# Patient Record
Sex: FEMALE | Race: WHITE
[De-identification: names, ages, dates, MRNs, and addresses within clinical notes are randomized per-mention and may not be internally consistent; named-entity substitution may affect disease eponyms.]

---

## 2019-04-02 ENCOUNTER — HOSPITAL ENCOUNTER (INPATIENT)
Dept: HOSPITAL 10 - E/R | Age: 59
LOS: 3 days | Discharge: HOME | DRG: 439 | End: 2019-04-05
Payer: MEDICAID

## 2019-04-02 ENCOUNTER — HOSPITAL ENCOUNTER (INPATIENT)
Dept: HOSPITAL 91 - E/R | Age: 59
LOS: 3 days | Discharge: HOME | DRG: 439 | End: 2019-04-05
Payer: MEDICAID

## 2019-04-02 VITALS
HEIGHT: 60 IN | BODY MASS INDEX: 26.62 KG/M2 | HEIGHT: 60 IN | BODY MASS INDEX: 26.62 KG/M2 | WEIGHT: 135.58 LBS | WEIGHT: 135.58 LBS

## 2019-04-02 DIAGNOSIS — K29.70: ICD-10-CM

## 2019-04-02 DIAGNOSIS — F17.200: ICD-10-CM

## 2019-04-02 DIAGNOSIS — K85.10: Primary | ICD-10-CM

## 2019-04-02 DIAGNOSIS — K80.10: ICD-10-CM

## 2019-04-02 DIAGNOSIS — K83.8: ICD-10-CM

## 2019-04-02 DIAGNOSIS — K76.0: ICD-10-CM

## 2019-04-02 DIAGNOSIS — E11.9: ICD-10-CM

## 2019-04-02 PROCEDURE — 93005 ELECTROCARDIOGRAM TRACING: CPT

## 2019-04-02 PROCEDURE — 85610 PROTHROMBIN TIME: CPT

## 2019-04-02 PROCEDURE — 80307 DRUG TEST PRSMV CHEM ANLYZR: CPT

## 2019-04-02 PROCEDURE — 82962 GLUCOSE BLOOD TEST: CPT

## 2019-04-02 PROCEDURE — 82306 VITAMIN D 25 HYDROXY: CPT

## 2019-04-02 PROCEDURE — 83036 HEMOGLOBIN GLYCOSYLATED A1C: CPT

## 2019-04-02 PROCEDURE — 86803 HEPATITIS C AB TEST: CPT

## 2019-04-02 PROCEDURE — 96374 THER/PROPH/DIAG INJ IV PUSH: CPT

## 2019-04-02 PROCEDURE — 80053 COMPREHEN METABOLIC PANEL: CPT

## 2019-04-02 PROCEDURE — 81001 URINALYSIS AUTO W/SCOPE: CPT

## 2019-04-02 PROCEDURE — 85025 COMPLETE CBC W/AUTO DIFF WBC: CPT

## 2019-04-02 PROCEDURE — 74181 MRI ABDOMEN W/O CONTRAST: CPT

## 2019-04-02 PROCEDURE — 85730 THROMBOPLASTIN TIME PARTIAL: CPT

## 2019-04-02 PROCEDURE — 84484 ASSAY OF TROPONIN QUANT: CPT

## 2019-04-02 PROCEDURE — 82150 ASSAY OF AMYLASE: CPT

## 2019-04-02 PROCEDURE — 80061 LIPID PANEL: CPT

## 2019-04-02 PROCEDURE — 86704 HEP B CORE ANTIBODY TOTAL: CPT

## 2019-04-02 PROCEDURE — 96375 TX/PRO/DX INJ NEW DRUG ADDON: CPT

## 2019-04-02 PROCEDURE — 71045 X-RAY EXAM CHEST 1 VIEW: CPT

## 2019-04-02 PROCEDURE — 36415 COLL VENOUS BLD VENIPUNCTURE: CPT

## 2019-04-02 PROCEDURE — 76705 ECHO EXAM OF ABDOMEN: CPT

## 2019-04-02 PROCEDURE — 83690 ASSAY OF LIPASE: CPT

## 2019-04-02 PROCEDURE — C9113 INJ PANTOPRAZOLE SODIUM, VIA: HCPCS

## 2019-04-02 PROCEDURE — 83735 ASSAY OF MAGNESIUM: CPT

## 2019-04-02 PROCEDURE — 84443 ASSAY THYROID STIM HORMONE: CPT

## 2019-04-02 PROCEDURE — 87340 HEPATITIS B SURFACE AG IA: CPT

## 2019-04-02 PROCEDURE — 99285 EMERGENCY DEPT VISIT HI MDM: CPT

## 2019-04-02 PROCEDURE — 86709 HEPATITIS A IGM ANTIBODY: CPT

## 2019-04-02 PROCEDURE — 74176 CT ABD & PELVIS W/O CONTRAST: CPT

## 2019-04-03 VITALS — HEART RATE: 91 BPM | RESPIRATION RATE: 17 BRPM | DIASTOLIC BLOOD PRESSURE: 55 MMHG | SYSTOLIC BLOOD PRESSURE: 111 MMHG

## 2019-04-03 VITALS — RESPIRATION RATE: 19 BRPM | HEART RATE: 90 BPM | SYSTOLIC BLOOD PRESSURE: 110 MMHG | DIASTOLIC BLOOD PRESSURE: 58 MMHG

## 2019-04-03 VITALS — HEART RATE: 83 BPM | RESPIRATION RATE: 18 BRPM | DIASTOLIC BLOOD PRESSURE: 72 MMHG | SYSTOLIC BLOOD PRESSURE: 121 MMHG

## 2019-04-03 VITALS — HEART RATE: 80 BPM | DIASTOLIC BLOOD PRESSURE: 59 MMHG | RESPIRATION RATE: 20 BRPM | SYSTOLIC BLOOD PRESSURE: 125 MMHG

## 2019-04-03 LAB
ADD MAN DIFF?: NO
ADD MAN DIFF?: NO
ADD UMIC: YES
ALANINE AMINOTRANSFERASE: 182 IU/L (ref 13–69)
ALANINE AMINOTRANSFERASE: 234 IU/L (ref 13–69)
ALBUMIN/GLOBULIN RATIO: 1.12
ALBUMIN/GLOBULIN RATIO: 1.18
ALBUMIN: 3.6 G/DL (ref 3.3–4.9)
ALBUMIN: 4.4 G/DL (ref 3.3–4.9)
ALKALINE PHOSPHATASE: 278 IU/L (ref 42–121)
ALKALINE PHOSPHATASE: 341 IU/L (ref 42–121)
ANION GAP: 10 (ref 5–13)
ANION GAP: 13 (ref 5–13)
ASPARTATE AMINO TRANSFERASE: 135 IU/L (ref 15–46)
ASPARTATE AMINO TRANSFERASE: 93 IU/L (ref 15–46)
BASOPHIL #: 0 10^3/UL (ref 0–0.1)
BASOPHIL #: 0 10^3/UL (ref 0–0.1)
BASOPHILS %: 0.2 % (ref 0–2)
BASOPHILS %: 0.3 % (ref 0–2)
BILIRUBIN,DIRECT: 0 MG/DL (ref 0–0.2)
BILIRUBIN,DIRECT: 0 MG/DL (ref 0–0.2)
BILIRUBIN,TOTAL: 1.1 MG/DL (ref 0.2–1.3)
BILIRUBIN,TOTAL: 1.4 MG/DL (ref 0.2–1.3)
BLOOD UREA NITROGEN: 10 MG/DL (ref 7–20)
BLOOD UREA NITROGEN: 12 MG/DL (ref 7–20)
CALCIUM: 10 MG/DL (ref 8.4–10.2)
CALCIUM: 9.2 MG/DL (ref 8.4–10.2)
CARBON DIOXIDE: 24 MMOL/L (ref 21–31)
CARBON DIOXIDE: 26 MMOL/L (ref 21–31)
CHLORIDE: 100 MMOL/L (ref 97–110)
CHLORIDE: 103 MMOL/L (ref 97–110)
CHOL/HDL RATIO: 2.3 RATIO
CHOLESTEROL: 164 MG/DL (ref 100–200)
CREATININE: 0.38 MG/DL (ref 0.44–1)
CREATININE: 0.38 MG/DL (ref 0.44–1)
EOSINOPHILS #: 0 10^3/UL (ref 0–0.5)
EOSINOPHILS #: 0 10^3/UL (ref 0–0.5)
EOSINOPHILS %: 0.1 % (ref 0–7)
EOSINOPHILS %: 0.2 % (ref 0–7)
ETHANOL: < 10 MG/DL (ref 0–0)
GLOBULIN: 3.2 G/DL (ref 1.3–3.2)
GLOBULIN: 3.7 G/DL (ref 1.3–3.2)
GLUCOSE: 129 MG/DL (ref 70–220)
GLUCOSE: 168 MG/DL (ref 70–220)
HAAIG REFLEX: (no result)
HDL CHOLESTEROL: 70 MG/DL (ref 37–92)
HEMATOCRIT: 36.9 % (ref 37–47)
HEMATOCRIT: 42.1 % (ref 37–47)
HEMOGLOBIN A1C: 10.6 % (ref 0–5.9)
HEMOGLOBIN: 12.1 G/DL (ref 12–16)
HEMOGLOBIN: 13.9 G/DL (ref 12–16)
HEPATITIS B CORE ANTIBODY: NEGATIVE
HEPATITIS B SURFACE ANTIGEN: NEGATIVE
HEPATITIS C VIRAL ANTIBODY: NEGATIVE
IMMATURE GRANS #M: 0.04 10^3/UL (ref 0–0.03)
IMMATURE GRANS #M: 0.05 10^3/UL (ref 0–0.03)
IMMATURE GRANS % (M): 0.4 % (ref 0–0.43)
IMMATURE GRANS % (M): 0.5 % (ref 0–0.43)
INR: 0.99
LDL CHOLESTEROL,CALCULATED: 71 MG/DL
LIPASE: 5464 U/L (ref 23–300)
LIPASE: 8173 U/L (ref 23–300)
LYMPHOCYTES #: 1.7 10^3/UL (ref 0.8–2.9)
LYMPHOCYTES #: 1.8 10^3/UL (ref 0.8–2.9)
LYMPHOCYTES %: 14.4 % (ref 15–51)
LYMPHOCYTES %: 21.4 % (ref 15–51)
MAGNESIUM: 2.1 MG/DL (ref 1.7–2.5)
MEAN CORPUSCULAR HEMOGLOBIN: 27.7 PG (ref 29–33)
MEAN CORPUSCULAR HEMOGLOBIN: 27.8 PG (ref 29–33)
MEAN CORPUSCULAR HGB CONC: 32.8 G/DL (ref 32–37)
MEAN CORPUSCULAR HGB CONC: 33 G/DL (ref 32–37)
MEAN CORPUSCULAR VOLUME: 84 FL (ref 82–101)
MEAN CORPUSCULAR VOLUME: 84.6 FL (ref 82–101)
MEAN PLATELET VOLUME: 10.3 FL (ref 7.4–10.4)
MEAN PLATELET VOLUME: 10.8 FL (ref 7.4–10.4)
MONOCYTE #: 0.5 10^3/UL (ref 0.3–0.9)
MONOCYTE #: 0.6 10^3/UL (ref 0.3–0.9)
MONOCYTES %: 5.2 % (ref 0–11)
MONOCYTES %: 5.9 % (ref 0–11)
NEUTROPHIL #: 6.2 10^3/UL (ref 1.6–7.5)
NEUTROPHIL #: 9.5 10^3/UL (ref 1.6–7.5)
NEUTROPHILS %: 71.8 % (ref 39–77)
NEUTROPHILS %: 79.6 % (ref 39–77)
NUCLEATED RED BLOOD CELLS #: 0 10^3/UL (ref 0–0)
NUCLEATED RED BLOOD CELLS #: 0 10^3/UL (ref 0–0)
NUCLEATED RED BLOOD CELLS%: 0 /100WBC (ref 0–0)
NUCLEATED RED BLOOD CELLS%: 0 /100WBC (ref 0–0)
PARTIAL THROMBOPLASTIN TIME: 31.1 SEC (ref 23–35)
PLATELET COUNT: 159 10^3/UL (ref 140–415)
PLATELET COUNT: 166 10^3/UL (ref 140–415)
POSITIVE DIFF: (no result)
POTASSIUM: 3.6 MMOL/L (ref 3.5–5.1)
POTASSIUM: 3.8 MMOL/L (ref 3.5–5.1)
PROTIME: 13.2 SEC (ref 11.9–14.9)
PT RATIO: 1
RED BLOOD COUNT: 4.36 10^6/UL (ref 4.2–5.4)
RED BLOOD COUNT: 5.01 10^6/UL (ref 4.2–5.4)
RED CELL DISTRIBUTION WIDTH: 13.2 % (ref 11.5–14.5)
RED CELL DISTRIBUTION WIDTH: 13.2 % (ref 11.5–14.5)
SODIUM: 137 MMOL/L (ref 135–144)
SODIUM: 139 MMOL/L (ref 135–144)
THYROID STIMULATING HORMONE: < 0.015 MIU/L (ref 0.47–4.68)
TOTAL PROTEIN: 6.8 G/DL (ref 6.1–8.1)
TOTAL PROTEIN: 8.1 G/DL (ref 6.1–8.1)
TRIGLYCERIDES: 114 MG/DL (ref 0–149)
TROPONIN-I: < 0.012 NG/ML (ref 0–0.12)
UR ASCORBIC ACID: NEGATIVE MG/DL
UR BACTERIA: (no result) /HPF
UR BILIRUBIN (DIP): NEGATIVE MG/DL
UR BLOOD (DIP): (no result) MG/DL
UR CLARITY: (no result)
UR COLOR: (no result)
UR GLUCOSE (DIP): NEGATIVE MG/DL
UR KETONES (DIP): (no result) MG/DL
UR LEUKOCYTE ESTERASE (DIP): NEGATIVE LEU/UL
UR MUCUS: (no result) /HPF
UR NITRITE (DIP): NEGATIVE MG/DL
UR PH (DIP): 5 (ref 5–9)
UR RBC: 2 /HPF (ref 0–5)
UR SPECIFIC GRAVITY (DIP): 1.02 (ref 1–1.03)
UR SQUAMOUS EPITHELIAL CELL: (no result) /HPF
UR TOTAL PROTEIN (DIP): NEGATIVE MG/DL
UR UROBILINOGEN (DIP): NEGATIVE MG/DL
UR WBC: 1 /HPF (ref 0–5)
WHITE BLOOD COUNT: 11.9 10^3/UL (ref 4.8–10.8)
WHITE BLOOD COUNT: 8.6 10^3/UL (ref 4.8–10.8)

## 2019-04-03 RX ADMIN — INSULIN GLARGINE 1 UNITS: 100 INJECTION, SOLUTION SUBCUTANEOUS at 13:04

## 2019-04-03 RX ADMIN — LIDOCAINE HYDROCHLORIDE 1 MLS/HR: 10 INJECTION, SOLUTION EPIDURAL; INFILTRATION; INTRACAUDAL; PERINEURAL at 00:02

## 2019-04-03 RX ADMIN — ASCORBIC ACID, VITAMIN A PALMITATE, CHOLECALCIFEROL, THIAMINE HYDROCHLORIDE, RIBOFLAVIN-5 PHOSPHATE SODIUM, PYRIDOXINE HYDROCHLORIDE, NIACINAMIDE, DEXPANTHENOL, ALPHA-TOCOPHEROL ACETATE, VITAMIN K1, FOLIC ACID, BIOTIN, CYANOCOBALAMIN 1 MLS/HR: 200; 3300; 200; 6; 3.6; 6; 40; 15; 10; 150; 600; 60; 5 INJECTION, SOLUTION INTRAVENOUS at 10:40

## 2019-04-03 RX ADMIN — CIPROFLOXACIN 1 MLS/HR: 2 INJECTION, SOLUTION INTRAVENOUS at 05:42

## 2019-04-03 RX ADMIN — HYDROMORPHONE HYDROCHLORIDE 1 MG: 1 INJECTION, SOLUTION INTRAMUSCULAR; INTRAVENOUS; SUBCUTANEOUS at 21:57

## 2019-04-03 RX ADMIN — INSULIN ASPART 1 UNIT: 100 INJECTION, SOLUTION INTRAVENOUS; SUBCUTANEOUS at 13:04

## 2019-04-03 RX ADMIN — FOLIC ACID SCH MLS/HR: 5 INJECTION, SOLUTION INTRAMUSCULAR; INTRAVENOUS; SUBCUTANEOUS at 10:40

## 2019-04-03 RX ADMIN — THIAMINE HYDROCHLORIDE 1 MLS/HR: 100 INJECTION, SOLUTION INTRAMUSCULAR; INTRAVENOUS at 02:31

## 2019-04-03 RX ADMIN — HYDROMORPHONE HYDROCHLORIDE 1 MG: 1 INJECTION, SOLUTION INTRAMUSCULAR; INTRAVENOUS; SUBCUTANEOUS at 09:54

## 2019-04-03 RX ADMIN — INSULIN ASPART 1 UNIT: 100 INJECTION, SOLUTION INTRAVENOUS; SUBCUTANEOUS at 17:59

## 2019-04-03 RX ADMIN — HYDROMORPHONE HYDROCHLORIDE PRN MG: 1 INJECTION, SOLUTION INTRAMUSCULAR; INTRAVENOUS; SUBCUTANEOUS at 21:57

## 2019-04-03 RX ADMIN — PANTOPRAZOLE SODIUM 1 MG: 40 INJECTION, POWDER, FOR SOLUTION INTRAVENOUS at 10:48

## 2019-04-03 RX ADMIN — PANTOPRAZOLE SODIUM SCH MG: 40 INJECTION, POWDER, FOR SOLUTION INTRAVENOUS at 10:48

## 2019-04-03 RX ADMIN — HYDROMORPHONE HYDROCHLORIDE PRN MG: 1 INJECTION, SOLUTION INTRAMUSCULAR; INTRAVENOUS; SUBCUTANEOUS at 18:00

## 2019-04-03 RX ADMIN — INSULIN ASPART 1 UNIT: 100 INJECTION, SOLUTION INTRAVENOUS; SUBCUTANEOUS at 21:26

## 2019-04-03 RX ADMIN — INSULIN ASPART 1 UNIT: 100 INJECTION, SOLUTION INTRAVENOUS; SUBCUTANEOUS at 09:00

## 2019-04-03 RX ADMIN — HYDROMORPHONE HYDROCHLORIDE 1 MG: 1 INJECTION, SOLUTION INTRAMUSCULAR; INTRAVENOUS; SUBCUTANEOUS at 18:00

## 2019-04-03 RX ADMIN — KETOROLAC TROMETHAMINE 1 MG: 15 INJECTION, SOLUTION INTRAMUSCULAR; INTRAVENOUS at 10:40

## 2019-04-03 RX ADMIN — INSULIN GLARGINE SCH UNITS: 100 INJECTION, SOLUTION SUBCUTANEOUS at 13:04

## 2019-04-03 RX ADMIN — CIPROFLOXACIN 1 MLS/HR: 2 INJECTION, SOLUTION INTRAVENOUS at 02:30

## 2019-04-03 RX ADMIN — ONDANSETRON HYDROCHLORIDE 1 MG: 2 INJECTION, SOLUTION INTRAMUSCULAR; INTRAVENOUS at 00:02

## 2019-04-03 RX ADMIN — FAMOTIDINE 1 MG: 10 INJECTION, SOLUTION INTRAVENOUS at 00:02

## 2019-04-03 NOTE — ERD
ER Documentation


Chief Complaint


Chief Complaint





EPIGASTRIC PAIN X'S 4 DAYS





HPI


This is a 58-year-old female with epigastric pain for 4 days.  Pain is mild to 


moderate intensity with associated nausea and 4 episodes of vomiting nonbilious 


and nonbloody.  She denies any fevers or chills.  Denies any recent abdominal 


trauma.  Denies any sick contacts.  Denies any change in bowel habits.  Has not 


been able to tolerate p.o. today secondary to pain.





ROS


All systems reviewed and are negative except as per history of present illness.





Allergies


Allergies:  


Coded Allergies:  


     No Known Allergy (Unverified , 4/3/19)





PMhx/Soc


Medical and Surgical Hx:  pt denies Medical Hx, pt denies Surgical Hx


Hx Alcohol Use:  No


Hx Substance Use:  No


Hx Tobacco Use:  No


Smoking Status:  Never smoker





Physical Exam


Vitals





Vital Signs


  Date      Temp  Pulse  Resp  B/P (MAP)   Pulse Ox  O2          O2 Flow    FiO2


Time                                                 Delivery    Rate


    4/2/19  99.3     89    20      140/69        98  Room Air


     23:36                           (92)


    4/2/19  99.3     95    20      145/65        98


     21:25                           (91)





Physical Exam


Const:   No acute distress


Head:   Atraumatic 


Eyes:    Normal Conjunctiva


ENT:    Normal External Ears, Nose and Mouth.


Neck:               Full range of motion. No meningismus.


Resp:   Clear to auscultation bilaterally


Cardio:   Regular rate and rhythm, no murmurs


Abd:    Soft, non tender, non distended. Normal bowel sounds


Skin:   No petechiae or rashes


Back:   No midline or flank tenderness


Ext:    No cyanosis, or edema


Neur:   Awake and alert


Psych:    Normal Mood and Affect


Result Diagram:  


4/2/19 2356                                                                     


          4/2/19 2356





Results 24 hrs





Laboratory Tests


              Test
                                   4/2/19
23:56


              White Blood Count                      11.9 10^3/ul


              Red Blood Count                        5.01 10^6/ul


              Hemoglobin                                13.9 g/dl


              Hematocrit                                   42.1 %


              Mean Corpuscular Volume                     84.0 fl


              Mean Corpuscular Hemoglobin                 27.7 pg


              Mean Corpuscular Hemoglobin
Concent      33.0 g/dl 



              Red Cell Distribution Width                  13.2 %


              Platelet Count                          159 10^3/UL


              Mean Platelet Volume                        10.8 fl


              Immature Granulocytes %                     0.400 %


              Neutrophils %                                79.6 %


              Lymphocytes %                                14.4 %


              Monocytes %                                   5.2 %


              Eosinophils %                                 0.1 %


              Basophils %                                   0.3 %


              Nucleated Red Blood Cells %             0.0 /100WBC


              Immature Granulocytes #               0.050 10^3/ul


              Neutrophils #                           9.5 10^3/ul


              Lymphocytes #                           1.7 10^3/ul


              Monocytes #                             0.6 10^3/ul


              Eosinophils #                           0.0 10^3/ul


              Basophils #                             0.0 10^3/ul


              Nucleated Red Blood Cells #             0.0 10^3/ul


              Urine Color                          JAYLA


              Urine Clarity
                       SLIGHTLY
CLOUDY


              Urine pH                                        5.0


              Urine Specific Gravity                        1.023


              Urine Ketones                              1+ mg/dL


              Urine Nitrite                        NEGATIVE mg/dL


              Urine Bilirubin                      NEGATIVE mg/dL


              Urine Urobilinogen                   NEGATIVE mg/dL


              Urine Leukocyte Esterase
            NEGATIVE
Zaid/ul


              Urine Microscopic RBC                        2 /HPF


              Urine Microscopic WBC                        1 /HPF


              Urine Squamous Epithelial
Cells      MODERATE /HPF 



              Urine Bacteria                       FEW /HPF


              Urine Mucus                          MODERATE /HPF


              Urine Hemoglobin                           1+ mg/dL


              Urine Glucose                        NEGATIVE mg/dL


              Urine Total Protein                  NEGATIVE mg/dl


              Sodium Level                             137 mmol/L


              Potassium Level                          3.8 mmol/L


              Chloride Level                           100 mmol/L


              Carbon Dioxide Level                      24 mmol/L


              Anion Gap                                        13


              Blood Urea Nitrogen                        12 mg/dl


              Creatinine                               0.38 mg/dl


              Est Glomerular Filtrat Rate
mL/min   > 60 mL/min 



              Glucose Level                             168 mg/dl


              Calcium Level                            10.0 mg/dl


              Total Bilirubin                           1.4 mg/dl


              Direct Bilirubin                         0.00 mg/dl


              Indirect Bilirubin                        1.4 mg/dl


              Aspartate Amino Transf
(AST/SGOT)         135 IU/L 



              Alanine Aminotransferase
(ALT/SGPT)       234 IU/L 



              Alkaline Phosphatase                       341 IU/L


              Troponin I                           < 0.012 ng/ml


              Total Protein                              8.1 g/dl


              Albumin                                    4.4 g/dl


              Globulin                                  3.70 g/dl


              Albumin/Globulin Ratio                         1.18


              Lipase                                     8173 U/L





Current Medications


 Medications
   Dose
          Sig/Bita
       Start Time
   Status  Last


 (Trade)       Ordered        Route
 PRN     Stop Time              Admin
Dose


                              Reason                                Admin


 Sodium         500 ml @ 
     Q1H STAT
      4/2/19        DC            4/3/19


Chloride       500 mls/hr     IV
            23:41
 4/3/19                00:02



                                             00:40


 Morphine       4 mg           ONCE  STAT
    4/2/19        DC            4/3/19


Sulfate
                      IV
            23:41
 4/2/19                00:02



(morphine)                                   23:42


 Ondansetron    4 mg           ONCE  STAT
    4/2/19        DC            4/3/19


HCl
  (Zofran                 IV
            23:41
 4/2/19                00:02



Inj)                                         23:42


 Famotidine
    20 mg          ONCE  STAT
    4/2/19        DC            4/3/19


(Pepcid Iv)                   IV
            23:41
 4/2/19                00:02



                                             23:42


                200 ml @ 
     ONCE  STAT
    4/3/19                 



Ciprofloxacin  200 mls/hr     IVPB
          02:30
 4/3/19


/
 Dextrose                                  03:29


 Sodium         1,000 ml @ 
   Q6H40M
 IV
    4/3/19                 



Chloride       150 mls/hr                    02:31



 IV Flush
      3 ml           PER            4/3/19                 



(NS 3 ml)                     PROTOCOL
 IV
  03:00



 Ondansetron    4 mg           Q6H  PRN
      4/3/19                 



HCl
  (Zofran                 IV
            03:00



Inj)                          NAUSEA/VOMITI


                              NG


                650 mg         Q6H  PRN
      4/3/19                 



Acetaminophen                 PO
 .PAIN 1-3  03:00




  (Tylenol                   OR TEMP


Tab)


                0.5 mg         Q4H  PRN
      4/3/19                 



Hydromorphone                 IV
 .SEVERE    03:00



HCl
                          PAIN 7-10


(Dilaudid)


 Docusate       100 mg         Q12H  PRN
     4/3/19                 



Sodium
                       PO
            03:00



(Colace)                      .CONSTIPATION


 Bisacodyl
     5 mg           DAILY  PRN
    4/3/19                 



(Dulcolax)                    PO
            03:00



                              .CONSTIPATION








Procedures/MDM


EKG: 


Rate/Rhythm:             [Normal Sinus Rhythm]


QRS, ST, T-waves:    [No changes consistent w/ acute ischemia], normal 


intervals, upgoing T waves, normal axis


Impression:      [No evidence of ischemia or arrhythmia]





Chest X-ray 1V Interpreted by me:


Soft Tissue:                                               No acute 


abnormalities


Bones:                                                    No acute abnormalities


Mediastinum/Cardiac Silhouette/Lungs:     [No acute abnormalities]








Medical decision making: This is a patient has what looks to be gallstone 


pancreatitis.  Patient has been fluid hydrated given pain medication.  Patient 


admitted to hospitalist with surgical consult from Dr. Brizuela.





Departure


Diagnosis:  


   Primary Impression:  


   Gallstone pancreatitis


   Additional Impression:  


   Epigastric pain


Condition:  Serious











ASHLEY LOMAX              Apr 3, 2019 02:39

## 2019-04-03 NOTE — CONS
Assessment/Plan


Assessment/Plan


Hospital Course (Demo Recall)


1.  Abdominal pain


2.  Nausea vomiting


3.  Gallstones


4.  Pancreatitis


5.  Transaminitis


6.  Hyperbilirubinemia


-N.p.o.


-Judicious IV fluid


-Trend labs


-GI consult


-MRCP


-Outpatient follow-up for cholecystectomy


7.  Gastritis


-Diet and lifestyle optimization


-Antacids





Thank you very much for consulting me this patient's care,





Consultation Date/Type/Reason


Admit Date/Time


Apr 3, 2019 at 02:30


Date of Consultation:  Apr 3, 2019


Type of Consult


General surgical


Reason for Consultation


Abdominal pain


Gallstone


Pancreatitis


Requesting Provider:  ASHLEY LOMAX


Date/Time of Note


DATE: 4/3/19 


TIME: 04:11





Hx of Present Illness


58-year-old female presents with 4 days of abdominal pain and epigastric region 


associated with nausea vomiting chills but no fever.  No cough.  No seizure.  No


blood per mouth or rectum.  No change in bowel habits.  No dysuria.  No color 


change of skin stool urine or eyeballs.  No previous history of the same.  No 


trauma or sick contacts.  Her workup identified pancreatitis with gallstones.  


LFTs and bilirubin are also elevated.  She is being admitted.  Surgical consult 


is obtained further evaluation and treatment.


12 point review of system is negative unless otherwise addressed in chart





Past Medical History


Gastritis


Questionable colonic issues


Acute pancreatitis


Acute transaminitis


Acute hyperbilirubinemia


Acute leukocytosis


Home Meds


Reported Medications


Acetaminophen* (Acetaminophen*) 500 MG Extra Strength Tablet, 500 MG PO Q4H PRN 


for PAIN AND OR ELEVATED TEMP, TAB


   4/3/19


Omeprazole* (Omeprazole*) Unknown Strength Capsule.dr, PO BID, #60 CAP


   4/3/19


Metformin* (Glucophage*) Unknown Strength Tab, PO WITH BREAKFAST DINNE, #30 TAB


   4/3/19


Medications





Current Medications


Sodium Chloride 1,000 ml @  150 mls/hr Q6H40M IV  Last administered on 4/3/19at 


02:31; Admin Dose 150 MLS/HR;  Start 4/3/19 at 02:31


IV Flush (NS 3 ml) 3 ml PER PROTOCOL IV ;  Start 4/3/19 at 03:00


Ondansetron HCl (Zofran Inj) 4 mg Q6H  PRN IV NAUSEA/VOMITING;  Start 4/3/19 at 


03:00


Acetaminophen (Tylenol Tab) 650 mg Q6H  PRN PO .PAIN 1-3 OR TEMP;  Start 4/3/19 


at 03:00


Hydromorphone HCl (Dilaudid) 0.5 mg Q4H  PRN IV .SEVERE PAIN 7-10;  Start 4/3/19


at 03:00


Docusate Sodium (Colace) 100 mg Q12H  PRN PO .CONSTIPATION;  Start 4/3/19 at 


03:00


Bisacodyl (Dulcolax) 5 mg DAILY  PRN PO .CONSTIPATION;  Start 4/3/19 at 03:00


Allergies:  


Coded Allergies:  


     No Known Allergy (Unverified , 4/3/19)





Past Surgical History


Bilateral axillary cystic lesion excision


Knee surgery





Family History


Significant Family History:  no pertinent family hx





Social History


Alcohol Use:  rarely


Smoking Status:  Current every day smoker


Drug Use:  none





Exam/Review of Systems


Exam


Vitals





Vital Signs


  Date      Temp  Pulse  Resp  B/P (MAP)   Pulse Ox  O2          O2 Flow    FiO2


Time                                                 Delivery    Rate


    4/3/19  98.8     90    19      110/58        97


     03:34                           (75)


    4/3/19                                           Room Air


     02:44





Constitutional:  alert, oriented; 


   No distress


Psych:  nl mood/affect; 


   No anxiety


Head:  normocephalic, atraumatic


Eyes:  nl conjunctiva, EOMI, PERRL


ENMT:  nl external ears & nose, mucosa pink and moist


Neck:  supple, non-tender; 


   No jvd


Respiratory:  normal air movement; 


   No congested cough, No labored breathing


Cardiovascular:  regular rate and rhythm; 


   No edema


Gastrointestinal:  soft, tender (Minimal); 


   No distended, No rebound or guarding


Musculoskeletal:  nl extremities to inspection; 


   No joint tenderness


Extremities:  normal pulses; 


   No calf tenderness, No cyanosis


Neurological:  nl mental status, nl speech, nl strength


Skin:  nl turgor; 


   No rash or lesions, No diaphoresis


Lymph:  nl lymph nodes





Results


Result Diagram:  


4/2/19 6256                                                                     


          4/2/19 2356





Results 24hrs





Laboratory Tests


            Test
                                      4/2/19
23:56


            White Blood Count                               11.9  H


            Red Blood Count                                  5.01


            Hemoglobin                                       13.9


            Hematocrit                                       42.1


            Mean Corpuscular Volume                          84.0


            Mean Corpuscular Hemoglobin                     27.7  L


            Mean Corpuscular Hemoglobin
Concent             33.0  



            Red Cell Distribution Width                      13.2


            Platelet Count                                    159


            Mean Platelet Volume                            10.8  H


            Immature Granulocytes %                         0.400


            Neutrophils %                                   79.6  H


            Lymphocytes %                                   14.4  L


            Monocytes %                                       5.2


            Eosinophils %                                     0.1


            Basophils %                                       0.3


            Nucleated Red Blood Cells %                       0.0


            Immature Granulocytes #                        0.050  H


            Neutrophils #                                    9.5  H


            Lymphocytes #                                     1.7


            Monocytes #                                       0.6


            Eosinophils #                                     0.0


            Basophils #                                       0.0


            Nucleated Red Blood Cells #                       0.0


            Urine Color                          JAYLA


            Urine Clarity
                       SLIGHTLY
CLOUDY  A


            Urine pH                                          5.0


            Urine Specific Gravity                          1.023


            Urine Ketones                                     1+  H


            Urine Nitrite                        NEGATIVE


            Urine Bilirubin                      NEGATIVE


            Urine Urobilinogen                   NEGATIVE


            Urine Leukocyte Esterase             NEGATIVE


            Urine Microscopic RBC                               2


            Urine Microscopic WBC                               1


            Urine Squamous Epithelial
Cells      MODERATE  



            Urine Bacteria                       FEW  A


            Urine Mucus                          MODERATE


            Urine Hemoglobin                                  1+  H


            Urine Glucose                        NEGATIVE


            Urine Total Protein                  NEGATIVE


            Sodium Level                                      137


            Potassium Level                                   3.8


            Chloride Level                                    100


            Carbon Dioxide Level                               24


            Anion Gap                                          13


            Blood Urea Nitrogen                                12


            Creatinine                                      0.38  L


            Est Glomerular Filtrat Rate
mL/min   > 60  



            Glucose Level                                     168


            Calcium Level                                    10.0


            Total Bilirubin                                  1.4  H


            Direct Bilirubin                                 0.00


            Indirect Bilirubin                               1.4  H


            Aspartate Amino Transf
(AST/SGOT)               135  H



            Alanine Aminotransferase
(ALT/SGPT)             234  H



            Alkaline Phosphatase                             341  H


            Troponin I                           < 0.012


            Total Protein                                     8.1


            Albumin                                           4.4


            Globulin                                        3.70  H


            Albumin/Globulin Ratio                           1.18


            Lipase                                          8173  H








Medications


Medication





Current Medications


Sodium Chloride 1,000 ml @  150 mls/hr Q6H40M IV  Last administered on 4/3/19at 


02:31; Admin Dose 150 MLS/HR;  Start 4/3/19 at 02:31


IV Flush (NS 3 ml) 3 ml PER PROTOCOL IV ;  Start 4/3/19 at 03:00


Ondansetron HCl (Zofran Inj) 4 mg Q6H  PRN IV NAUSEA/VOMITING;  Start 4/3/19 at 


03:00


Acetaminophen (Tylenol Tab) 650 mg Q6H  PRN PO .PAIN 1-3 OR TEMP;  Start 4/3/19 


at 03:00


Hydromorphone HCl (Dilaudid) 0.5 mg Q4H  PRN IV .SEVERE PAIN 7-10;  Start 4/3/19


at 03:00


Docusate Sodium (Colace) 100 mg Q12H  PRN PO .CONSTIPATION;  Start 4/3/19 at 


03:00


Bisacodyl (Dulcolax) 5 mg DAILY  PRN PO .CONSTIPATION;  Start 4/3/19 at 03:00











JOSE MARIA LU MD               Apr 3, 2019 04:12

## 2019-04-03 NOTE — CONS
Assessment/Plan


Assessment/Plan


Hospital Course (Demo Recall)


Summary Assessment and Plan:


Assessment:


Acute pancreatitis likely secondary to cholelithiasis


Cholelithiasis


Elevated LFTs-down


   -Hepatitis serology pending


Indirect hyperbilirubinemia-resolved





Plan:


MRCP is currently pending if positive will proceed with ERCP after pancreatitis 


has improved/resolved


Keep n.p.o. push IV fluids


Pain management as needed


Continue close observation


Patient seen in collaboration with Dr. Cheng


CC:   JACKI CHENG MD ;





Consultation Date/Type/Reason


Admit Date/Time


Apr 3, 2019 at 02:30


Date of Consultation:  Apr 3, 2019


Type of Consult


GI


Reason for Consultation


Pancreatitis, likely secondary to gallstones


Date/Time of Note


DATE: 4/3/19 


TIME: 12:27





Hx of Present Illness


This is a 58-year-old Divehi-speaking female (an  was used) resents 


to the hospital with complaints of upper abdominal pain associate with nausea 


nonbloody vomiting here patient underwent imaging showing distended gallbladder 


with cholelithiasis and mildly dilated common bile duct additionally labs were 


obtained, with noted indirect hyperbilirubinemia and elevated LFTs as well as a 


very elevated lipase of 8173.  Serology was checked and currently pending 


toxicology was completed and negative for alcohol INR 0.99.  She has been 


consulted for possible gallstone pancreatitis.  Given imaging findings and MRCP 


has been ordered and is currently pending patient is currently n.p.o.  She 


complains of upper left quadrant pain radiating to the back 8 out of 10.  She 


now denies further episodes of nausea or vomiting and denies overt signs of GI 


bleed, constipation, or diarrhea.  Discussed plan to push IV fluids pain 


management as needed obtain MRCP.  If positive will proceed with ERCP with 


resolution of pancreatitis.


Review of Systems: 


[A 12 system, review was conducted and is negative except as noted in the HPI or


 here.]





Past Medical History


Home Meds


Reported Medications


Acetaminophen* (Acetaminophen*) 500 MG Extra Strength Tablet, 500 MG PO Q4H PRN 


for PAIN AND OR ELEVATED TEMP, TAB


   4/3/19


Omeprazole* (Omeprazole*) Unknown Strength Capsule.dr, PO BID, #60 CAP


   4/3/19


Metformin* (Glucophage*) Unknown Strength Tab, PO WITH BREAKFAST DINNE, #30 TAB


   4/3/19


Medications





Current Medications


IV Flush (NS 3 ml) 3 ml PER PROTOCOL IV ;  Start 4/3/19 at 03:00


Ondansetron HCl (Zofran Inj) 4 mg Q6H  PRN IV NAUSEA/VOMITING;  Start 4/3/19 at 


03:00


Acetaminophen (Tylenol Tab) 650 mg Q6H  PRN PO .PAIN 1-3 OR TEMP;  Start 4/3/19 


at 03:00


Docusate Sodium (Colace) 100 mg Q12H  PRN PO .CONSTIPATION;  Start 4/3/19 at 


03:00


Bisacodyl (Dulcolax) 5 mg DAILY  PRN PO .CONSTIPATION;  Start 4/3/19 at 03:00


Diagnostic Test (Pha) (Accu-Chek) 1 ea 02 XX ;  Start 4/4/19 at 02:00


Insulin Aspart (Novolog Insulin Pen) NOVOLOG *MILD* ALGORI... Q4 SC ;  Start 


4/3/19 at 09:00


Miscellaneous Information 1 ea NOTE XX ;  Start 4/3/19 at 09:00


Glucose (Glutose) 15 gm Q15M  PRN PO DECREASED GLUCOSE;  Start 4/3/19 at 09:00


Glucose (Glutose) 22.5 gm Q15M  PRN PO DECREASED GLUCOSE;  Start 4/3/19 at 09:00


Dextrose (D50w Syringe) 25 ml Q15M  PRN IV DECREASED GLUCOSE;  Start 4/3/19 at 


09:00


Dextrose (D50w Syringe) 50 ml Q15M  PRN IV DECREASED GLUCOSE;  Start 4/3/19 at 


09:00


Glucagon (Glucagen) 1 mg Q15M  PRN IM DECREASED GLUCOSE;  Start 4/3/19 at 09:00


Glucose (Glutose) 15 gm Q15M  PRN BUCCAL DECREASED GLUCOSE;  Start 4/3/19 at 


09:00


Hydromorphone HCl (Dilaudid) 1 mg Q4H  PRN IV .SEVERE PAIN 7-10;  Start 4/3/19 


at 11:00


Dextrose/Sodium Chloride 1,000 ml @  80 mls/hr [id number] IV  Last administered on 


4/3/19at 10:40; Admin Dose 80 MLS/HR;  Start 4/3/19 at 10:30


Insulin Glargine (Lantus) 9 units DAILY@0800 SC ;  Start 4/3/19 at 10:30


Pantoprazole (Protonix Iv) 40 mg DAILY@06 IV  Last administered on 4/3/19at 


10:48; Admin Dose 40 MG;  Start 4/3/19 at 10:30


Allergies:  


Coded Allergies:  


     No Known Allergy (Unverified , 4/3/19)





Social History


Alcohol Use:  rarely


Smoking Status:  Current every day smoker


Drug Use:  none





Exam/Review of Systems


Exam


Vitals





Vital Signs


  Date      Temp  Pulse  Resp  B/P (MAP)   Pulse Ox  O2          O2 Flow    FiO2


Time                                                 Delivery    Rate


    4/3/19  98.4     91    17      111/55        95


     07:16                           (73)


    4/3/19                                           Room Air


     02:44








Intake and Output





4/2/19


4/2/19


4/3/19





1515:00


23:00


07:00





IntakeIntake Total


575 ml





BalanceBalance


575 ml











Exam


PHYSICAL EXAMINATION:


GENERAL: Well developed, well nourished, alert & oriented x 3


SKIN: No lesions


EYES: Pupils equal reactive to light,  no discharge.


EARS/NOSE AND THROAT: Ears normal, nose normal


NECK: Supple, no masses


CHEST: Inspection within normal limits.


CARDIOVASCULAR: Heart: Regular rate and rhythm


RESPIRATORY: Lungs clear to auscultation 


GASTROINTESTINAL AND LIVER: Abdomen: Soft, LUQ pain 8/10, non-distended, no 


hernias, no masses, no organomegaly, no ascites, no guarding, no rebound 


tenderness, normoactive bowel sounds. Rectal: Deferred. 


EXTREMITIES: No cyanosis, clubbing or edema.





Results


Result Diagram:  


4/3/19 0418                                                                     


          4/3/19 0418





Results 24hrs





Laboratory Tests


Test
                     4/2/19
23:56  4/3/19
04:18  4/3/19
08:41  4/3/19
09:46


White Blood Count              11.9  H        8.6  #


Red Blood Count                 5.01          4.36


Hemoglobin                      13.9          12.1


Hematocrit                      42.1         36.9  L


Mean Corpuscular                84.0          84.6


Volume


Mean Corpuscular               27.7  L       27.8  L


Hemoglobin


Mean Corpuscular               33.0  
       32.8  
  
             



Hemoglobin
Concent


Red Cell                        13.2          13.2


Distribution Width


Platelet Count                   159           166


Mean Platelet                  10.8  H        10.3


Volume


Immature                       0.400        0.500  H


Granulocytes %


Neutrophils %                  79.6  H        71.8


Lymphocytes %                  14.4  L        21.4


Monocytes %                      5.2           5.9


Eosinophils %                    0.1           0.2


Basophils %                      0.3           0.2


Nucleated Red                    0.0           0.0


Blood Cells %


Immature                      0.050  H      0.040  H


Granulocytes #


Neutrophils #                   9.5  H         6.2


Lymphocytes #                    1.7           1.8


Monocytes #                      0.6           0.5


Eosinophils #                    0.0           0.0


Basophils #                      0.0           0.0


Nucleated Red                    0.0           0.0


Blood Cells #


Urine Color         JAYLA


Urine Clarity
      SLIGHTLY
CLOUDY  A  
             
             



Urine pH                         5.0


Urine Specific                 1.023


Gravity


Urine Ketones                    1+  H


Urine Nitrite       NEGATIVE


Urine Bilirubin     NEGATIVE


Urine Urobilinogen  NEGATIVE


Urine Leukocyte     NEGATIVE


Esterase


Urine Microscopic                  2


RBC


Urine Microscopic                  1


WBC


Urine Squamous      MODERATE  
         
             
             



Epithelial
Cells


Urine Bacteria      FEW  A


Urine Mucus         MODERATE


Urine Hemoglobin                 1+  H


Urine Glucose       NEGATIVE


Urine Total         NEGATIVE


Protein


Sodium Level                     137           139


Potassium Level                  3.8           3.6


Chloride Level                   100           103


Carbon Dioxide                    24            26


Level


Anion Gap                         13            10


Blood Urea                        12            10


Nitrogen


Creatinine                     0.38  L       0.38  L


Est Glomerular      > 60  
             > 60  
       
             



Filtrat


Rate
mL/min


Glucose Level                    168           129


Calcium Level                   10.0           9.2


Total Bilirubin                 1.4  H         1.1


Direct Bilirubin                0.00          0.00


Indirect Bilirubin              1.4  H         1.1


Aspartate Amino                135  H
        93  H
  
             



Transf
(AST/SGOT)


Alanine                        234  H
       182  H
  
             



Aminotransferase
(


ALT/SGPT)


Alkaline                        341  H        278  H


Phosphatase


Troponin I          < 0.012


Total Protein                    8.1          6.8  #


Albumin                          4.4           3.6


Globulin                       3.70  H        3.20


Albumin/Globulin                1.18          1.12


Ratio


Lipase                         8173  H       5464  H


Hemoglobin A1c                               10.6  H


Magnesium Level                                2.1


Triglycerides                                  114


Level


Cholesterol Level                              164


LDL Cholesterol,                                71


Calculated


HDL Cholesterol                                 70


Cholesterol/HDL                                2.3


Ratio


Thyroid             
                   < 0.015  L
   
             



Stimulating


Hormone
(TSH)


Bedside Glucose                                              145


Prothrombin Time                                                          13.2


Prothrombin Time                                                           1.0


Ratio


INR International   
                   
             
                  0.99  



Normalized
Ratio


Activated           
                   
             
                  31.1  



Partial
Thrombopla


st Time


Ethyl Alcohol                                                       < 10.0  H


Level


Hepatitis B                                                         NEGATIVE


Surface Antigen


Hepatitis B Core    
                   
             
             NEGATIVE  



Total
Antibody


Hepatitis C                                                         NEGATIVE


Antibody








Medications


Medication





Current Medications


IV Flush (NS 3 ml) 3 ml PER PROTOCOL IV ;  Start 4/3/19 at 03:00


Ondansetron HCl (Zofran Inj) 4 mg Q6H  PRN IV NAUSEA/VOMITING;  Start 4/3/19 at 


03:00


Acetaminophen (Tylenol Tab) 650 mg Q6H  PRN PO .PAIN 1-3 OR TEMP;  Start 4/3/19 


at 03:00


Docusate Sodium (Colace) 100 mg Q12H  PRN PO .CONSTIPATION;  Start 4/3/19 at 03:


00


Bisacodyl (Dulcolax) 5 mg DAILY  PRN PO .CONSTIPATION;  Start 4/3/19 at 03:00


Diagnostic Test (Pha) (Accu-Chek) 1 ea 02 XX ;  Start 4/4/19 at 02:00


Insulin Aspart (Novolog Insulin Pen) NOVOLOG *MILD* ALGORI... Q4 SC ;  Start 


4/3/19 at 09:00


Miscellaneous Information 1 ea NOTE XX ;  Start 4/3/19 at 09:00


Glucose (Glutose) 15 gm Q15M  PRN PO DECREASED GLUCOSE;  Start 4/3/19 at 09:00


Glucose (Glutose) 22.5 gm Q15M  PRN PO DECREASED GLUCOSE;  Start 4/3/19 at 09:00


Dextrose (D50w Syringe) 25 ml Q15M  PRN IV DECREASED GLUCOSE;  Start 4/3/19 at 


09:00


Dextrose (D50w Syringe) 50 ml Q15M  PRN IV DECREASED GLUCOSE;  Start 4/3/19 at 


09:00


Glucagon (Glucagen) 1 mg Q15M  PRN IM DECREASED GLUCOSE;  Start 4/3/19 at 09:00


Glucose (Glutose) 15 gm Q15M  PRN BUCCAL DECREASED GLUCOSE;  Start 4/3/19 at 


09:00


Hydromorphone HCl (Dilaudid) 1 mg Q4H  PRN IV .SEVERE PAIN 7-10;  Start 4/3/19 


at 11:00


Dextrose/Sodium Chloride 1,000 ml @  80 mls/hr [id number] IV  Last administered on 


4/3/19at 10:40; Admin Dose 80 MLS/HR;  Start 4/3/19 at 10:30


Insulin Glargine (Lantus) 9 units DAILY@0800 SC ;  Start 4/3/19 at 10:30


Pantoprazole (Protonix Iv) 40 mg DAILY@06 IV  Last administered on 4/3/19at 


10:48; Admin Dose 40 MG;  Start 4/3/19 at 10:30











EMMETT DUQUE              Apr 3, 2019 12:30

## 2019-04-03 NOTE — HP
Date/Time of Note


Date/Time of Note


DATE: 4/3/19 


TIME: 07:54





Assessment/Plan


VTE Prophylaxis


SCD applied (from Nsg):  Yes


Pharmacological prophylaxis:  NA/contraindicated


Pharm contraindication:  low risk/ambulating





Lines/Catheters


IV Catheter Type (from Nrsg):  Peripheral IV


Urinary Cath still in place:  No





Assessment/Plan


Hospital Course


This is a 50-year-old female being admitted to the [facility] floor for:





#1 gallstone pancreatitis: Likely secondary to gallstone.  Patient does have 


transaminitis with hyperbilirubinemia.  CT scan does show CBD dilatation.  Will 


obtain a right upper quadrant ultrasound.  Will obtain an MRCP.  General surgery


is Jeffrey been consulted by the ED.  Will consult GI as well.  We will keep the 


patient n.p.o.  IV fluid hydration with normal saline.  Dilaudid for pain.  Will


check hemoglobin A1c, lipid panel, TSH.  Will also check ethanol level.





#2  Diabetes mellitus: We will check hemoglobin A1c, will hold metformin at the 


current time.  Insulin sliding scale





#3 DVT GI prophylaxis: SCDs, no GI prophylaxis indicated





Further treatment strategy will be implemented as per the clinical course.


Result Diagram:  


4/3/19 0418                                                                     


          4/3/19 0418





Results 24hrs





Laboratory Tests


     Test
                                      4/2/19
23:56  4/3/19
04:18


     White Blood Count                               11.9  H        8.6  #


     Red Blood Count                                  5.01          4.36


     Hemoglobin                                       13.9          12.1


     Hematocrit                                       42.1         36.9  L


     Mean Corpuscular Volume                          84.0          84.6


     Mean Corpuscular Hemoglobin                     27.7  L       27.8  L


     Mean Corpuscular Hemoglobin
Concent             33.0  
       32.8  



     Red Cell Distribution Width                      13.2          13.2


     Platelet Count                                    159           166


     Mean Platelet Volume                            10.8  H        10.3


     Immature Granulocytes %                         0.400        0.500  H


     Neutrophils %                                   79.6  H        71.8


     Lymphocytes %                                   14.4  L        21.4


     Monocytes %                                       5.2           5.9


     Eosinophils %                                     0.1           0.2


     Basophils %                                       0.3           0.2


     Nucleated Red Blood Cells %                       0.0           0.0


     Immature Granulocytes #                        0.050  H      0.040  H


     Neutrophils #                                    9.5  H         6.2


     Lymphocytes #                                     1.7           1.8


     Monocytes #                                       0.6           0.5


     Eosinophils #                                     0.0           0.0


     Basophils #                                       0.0           0.0


     Nucleated Red Blood Cells #                       0.0           0.0


     Urine Color                          JAYLA


     Urine Clarity
                       SLIGHTLY
CLOUDY  A  



     Urine pH                                          5.0


     Urine Specific Gravity                          1.023


     Urine Ketones                                     1+  H


     Urine Nitrite                        NEGATIVE


     Urine Bilirubin                      NEGATIVE


     Urine Urobilinogen                   NEGATIVE


     Urine Leukocyte Esterase             NEGATIVE


     Urine Microscopic RBC                               2


     Urine Microscopic WBC                               1


     Urine Squamous Epithelial
Cells      MODERATE  
         



     Urine Bacteria                       FEW  A


     Urine Mucus                          MODERATE


     Urine Hemoglobin                                  1+  H


     Urine Glucose                        NEGATIVE


     Urine Total Protein                  NEGATIVE


     Sodium Level                                      137           139


     Potassium Level                                   3.8           3.6


     Chloride Level                                    100           103


     Carbon Dioxide Level                               24            26


     Anion Gap                                          13            10


     Blood Urea Nitrogen                                12            10


     Creatinine                                      0.38  L       0.38  L


     Est Glomerular Filtrat Rate
mL/min   > 60  
             > 60  



     Glucose Level                                     168           129


     Calcium Level                                    10.0           9.2


     Total Bilirubin                                  1.4  H         1.1


     Direct Bilirubin                                 0.00          0.00


     Indirect Bilirubin                               1.4  H         1.1


     Aspartate Amino Transf
(AST/SGOT)               135  H
        93  H



     Alanine Aminotransferase
(ALT/SGPT)             234  H
       182  H



     Alkaline Phosphatase                             341  H        278  H


     Troponin I                           < 0.012


     Total Protein                                     8.1          6.8  #


     Albumin                                           4.4           3.6


     Globulin                                        3.70  H        3.20


     Albumin/Globulin Ratio                           1.18          1.12


     Lipase                                          8173  H       5464  H


     Hemoglobin A1c                                                10.6  H


     Magnesium Level                                                 2.1


     Triglycerides Level                                             114


     Cholesterol Level                                               164


     LDL Cholesterol, Calculated                                      71


     HDL Cholesterol                                                  70


     Cholesterol/HDL Ratio                                           2.3


     Thyroid Stimulating Hormone
(TSH)    
                   < 0.015  L









HPI/ROS


Admit Date/Time


Admit Date/Time


Apr 3, 2019 at 02:30





Hx of Present Illness


Chief complaint: Epigastric pain times 4 days





This is a 58-year-old female presents with 4 days of abdominal pain and 


epigastric region associated with nausea vomiting chills but no fever.  No 


cough.  No seizure.  No blood per mouth or rectum.  No change in bowel habits.  


No dysuria.  No color change of skin stool urine or eyeballs.  No previous 


history of the same.  No trauma or sick contacts.  





Allergies: NKDA





Medications: None





ROS


Const: As per HPI


Eyes : No pain discharge or redness or change in visual acuity


ENT: No pain, sore throat, congestion, congestion,  dysphagia or discharge


Respiratory: No shortness of breath, cough, sputum, wheezing, or pleuritic pain


Cardiovascular: No chest pain, palpitation, PND, or edema


GI : As per HPI


Genitourinary: No dysuria, hematuria, flank pain ,  discharge or CVA tenderness


Musculoskeletal: No joint pain, back pain, neck pain, restricted range of motion


in neck or joints


Skin: No rash, bruising or hives 


Neuro: No headache, dizziness, syncope, seizure, focal weakness


Endocrine: No polyuria, polydipsia, temperature intolerance


Psych: No hallucination, depression, anxiety or suicidal ideation





PMH/Family/Social


Past Medical History


Diabetes


Medications





Current Medications


Sodium Chloride 1,000 ml @  150 mls/hr Q6H40M IV  Last administered on 4/3/19at 


02:31; Admin Dose 150 MLS/HR;  Start 4/3/19 at 02:31


IV Flush (NS 3 ml) 3 ml PER PROTOCOL IV ;  Start 4/3/19 at 03:00


Ondansetron HCl (Zofran Inj) 4 mg Q6H  PRN IV NAUSEA/VOMITING;  Start 4/3/19 at 


03:00


Acetaminophen (Tylenol Tab) 650 mg Q6H  PRN PO .PAIN 1-3 OR TEMP;  Start 4/3/19 


at 03:00


Hydromorphone HCl (Dilaudid) 0.5 mg Q4H  PRN IV .SEVERE PAIN 7-10;  Start 4/3/19


at 03:00


Docusate Sodium (Colace) 100 mg Q12H  PRN PO .CONSTIPATION;  Start 4/3/19 at 


03:00


Bisacodyl (Dulcolax) 5 mg DAILY  PRN PO .CONSTIPATION;  Start 4/3/19 at 03:00


Coded Allergies:  


     No Known Allergy (Unverified , 4/3/19)





Past Surgical History


Right knee surgery





Family History


Significant Family History:  no pertinent family hx





Social History


Alcohol Use:  rarely


Smoking Status:  Current every day smoker


Drug Use:  none





Exam/Review of Systems


Vital Signs


Vitals





Vital Signs


  Date      Temp  Pulse  Resp  B/P (MAP)   Pulse Ox  O2          O2 Flow    FiO2


Time                                                 Delivery    Rate


    4/3/19  98.4     91    17      111/55        95


     07:16                           (73)


    4/3/19                                           Room Air


     02:44








Intake and Output





4/2/19


4/2/19


4/3/19





1414:59


22:59


06:59





IntakeIntake Total


575 ml





BalanceBalance


575 ml














Exam


Exam





General: Patient is a pleasant female currently lying in bed in no acute 


distress


HEENT: Atraumatic, normocephalic. The pupils are equal, round and reactive. 


Extraocular motor are intact


Neck: Supple with full range of motion. No rigidity or meningismus


Chest: Nontender


Lungs: Clear to auscultation bilaterally no crackles rales or wheezing


Heart: Normal S1-S2, Regular rhythm and rate. No murmur, S3, or S4


Abdomen: Soft , tender to palpation over the right upper quadrant as well as 


epigastric area,, bowel sounds are present. No guarding no rebound tenderness , 


No masses or organomegaly. No costovertebral temporal angle mass


Extremities: Normal to inspection, no edema no cyanosis


Neurologic: Normal mental status, speech normal, cranial nerves II through XII 


are intact, motor and sensory are intact,


Additional Comments


PROCEDURE:    CT ABDOMEN/PELVIS WITHOUT CONTRAST  


 


CLINICAL INDICATION:   58-year-old female with abdominal pain. 


 


TECHNIQUE:   The study was performed utilizing a [employer] VCT 64-slice CT 


scanner.  Direct axial sections were obtained through the abdomen and pelvis 


without the use of intravenous contrast material. Sagittal and coronal 


reformations were obtained.  One or more of the following dose reduction 


techniques were utilized: automated exposure control, adjustment of the mA 


and/or kV according to patient's size, use of iterative reconstruction 


technique.  DICOM images are available. The images were reviewed on a PACS 


workstation.   CTD/vol = 8.05 mGy; Total Exam DLP = 454.03 mGy.cm. 


 


COMPARISON:    None. 


 


FINDINGS:


 


There is minimal bibasilar subsegmental atelectasis.  There is no evidence for 


significant pleural effusion.  The liver has a normal size and contour without 


focal areas of abnormal density. No intrahepatic biliary ductal dilatation is 


seen. There is a lateral gallbladder which appears to be distended and contains 


multiple small dependent gallstones without gallbladder wall thickening. The 


distal common bile duct is enlarged measuring approximately 8 mm. The pancreas 


is without areas of abnormal attenuation.  The spleen is identified and has a 


normal size without abnormal density. The adrenal glands are unremarkable. The 


kidneys are without abnormal density. No hydroureteronephrosis nor 


nephroureterolithiasis is evident. The urinary bladder contains urine. There is 


no evidence for bowel obstruction.  The appendix is visualized and is without 


abnormal thickening or surrounding inflammatory reaction. The uterus is 


unremarkable. There is no significant free fluid. The aortoiliac vessels 


are mildly calcified but without aneurysmal dilatation. The osseous structures 


are intact. 


 


IMPRESSION:


 


1.  Distended lateral gallbladder with cholelithiasis and mildly dilated common 


bile duct.


2.  No CT evidence for appendicitis.


3.  Mild vascular calcifications.


_____________________________________________ 


.Igor Jenkins MD, MD           Date    Time 


Electronically viewed and signed by .Igor Jenkins MD, MD on 04/03/2019 01:27 


 


D:  04/03/2019 01:27  T:  04/03/2019 01:27


.M/





CC: ASHLEY LOMAX





[id number]


PROCEDURE:   CHEST - 1 VIEW  


 


CLINICAL INDICATION:   58-year-old female with chest/abdominal pain. 


 


TECHNIQUE:   A single frontal AP semi-erect portable view of the chest was 


performed.  The images were reviewed on a PACS workstation. 


 


COMPARISON:   None.  


 


FINDINGS:


 


The cardiomediastinal silhouette has a normal appearance.  There is no evidence 


for an infiltrate.  There is no evidence for congestive heart failure. There is 


no evidence for pneumothorax. The osseous structures are intact. 


 


IMPRESSION:


 


No evidence for active cardiopulmonary disease.


_____________________________________________ 


.Igor Jenkins MD, MD           Date    Time 


Electronically viewed and signed by .Igor Jenkins MD, MD on 04/03/2019 01:30 


 


D:  04/03/2019 01:30  T:  04/03/2019 01:30


.M/





CC: ASHLEY LOMAX





[id number]











IBAN VALLADARES                  Apr 3, 2019 08:04

## 2019-04-03 NOTE — QN
Documentation


Comment


This is a 58-year-old female with history of diabetes, admitted with sudden 


onset of right lower quadrant abdominal pain associated with 


nonbilious/nonbloody vomiting times 4-day duration, found to have biliary 


pancreatitis.





Appreciate surgery recommendation who recommended outpatient elective 


cholecystectomy.  Patient with CBD dilatation and CT, as such MRCP has been 


ordered.  We will follow-up MRCP findings and gastroenterology recommendations. 


Continue n.p.o. status.  Will add insulin Lantus for diabetes management.  Trend


lipase.  Will give 1 dose of Toradol as patient is in excruciating abdominal 


pain and will increase Dilaudid to 1 mg as needed.





Case discussed with Dr. Elizondo.











JOSÉ MANUEL HULL NP                Apr 3, 2019 10:37

## 2019-04-04 VITALS — HEART RATE: 70 BPM | RESPIRATION RATE: 18 BRPM | DIASTOLIC BLOOD PRESSURE: 54 MMHG | SYSTOLIC BLOOD PRESSURE: 104 MMHG

## 2019-04-04 VITALS — RESPIRATION RATE: 20 BRPM | HEART RATE: 74 BPM | SYSTOLIC BLOOD PRESSURE: 118 MMHG | DIASTOLIC BLOOD PRESSURE: 58 MMHG

## 2019-04-04 VITALS — HEART RATE: 78 BPM | RESPIRATION RATE: 18 BRPM | DIASTOLIC BLOOD PRESSURE: 54 MMHG | SYSTOLIC BLOOD PRESSURE: 108 MMHG

## 2019-04-04 VITALS — RESPIRATION RATE: 20 BRPM | SYSTOLIC BLOOD PRESSURE: 121 MMHG | HEART RATE: 84 BPM | DIASTOLIC BLOOD PRESSURE: 58 MMHG

## 2019-04-04 LAB
ADD MAN DIFF?: NO
ALANINE AMINOTRANSFERASE: 130 IU/L (ref 13–69)
ALBUMIN/GLOBULIN RATIO: 1.12
ALBUMIN: 3.6 G/DL (ref 3.3–4.9)
ALKALINE PHOSPHATASE: 241 IU/L (ref 42–121)
ANION GAP: 10 (ref 5–13)
ASPARTATE AMINO TRANSFERASE: 58 IU/L (ref 15–46)
BASOPHIL #: 0 10^3/UL (ref 0–0.1)
BASOPHILS %: 0.4 % (ref 0–2)
BILIRUBIN,DIRECT: 0 MG/DL (ref 0–0.2)
BILIRUBIN,TOTAL: 0.6 MG/DL (ref 0.2–1.3)
BLOOD UREA NITROGEN: 9 MG/DL (ref 7–20)
CALCIUM: 9.3 MG/DL (ref 8.4–10.2)
CARBON DIOXIDE: 27 MMOL/L (ref 21–31)
CHLORIDE: 106 MMOL/L (ref 97–110)
CREATININE: 0.43 MG/DL (ref 0.44–1)
EOSINOPHILS #: 0.1 10^3/UL (ref 0–0.5)
EOSINOPHILS %: 1.8 % (ref 0–7)
GLOBULIN: 3.2 G/DL (ref 1.3–3.2)
GLUCOSE: 146 MG/DL (ref 70–220)
HEMATOCRIT: 38.8 % (ref 37–47)
HEMOGLOBIN: 12.3 G/DL (ref 12–16)
IMMATURE GRANS #M: 0.03 10^3/UL (ref 0–0.03)
IMMATURE GRANS % (M): 0.6 % (ref 0–0.43)
LIPASE: 459 U/L (ref 23–300)
LYMPHOCYTES #: 1.8 10^3/UL (ref 0.8–2.9)
LYMPHOCYTES %: 36.3 % (ref 15–51)
MAGNESIUM: 2.2 MG/DL (ref 1.7–2.5)
MEAN CORPUSCULAR HEMOGLOBIN: 27.4 PG (ref 29–33)
MEAN CORPUSCULAR HGB CONC: 31.7 G/DL (ref 32–37)
MEAN CORPUSCULAR VOLUME: 86.4 FL (ref 82–101)
MEAN PLATELET VOLUME: 10.5 FL (ref 7.4–10.4)
MONOCYTE #: 0.5 10^3/UL (ref 0.3–0.9)
MONOCYTES %: 9.9 % (ref 0–11)
NEUTROPHIL #: 2.5 10^3/UL (ref 1.6–7.5)
NEUTROPHILS %: 51 % (ref 39–77)
NUCLEATED RED BLOOD CELLS #: 0 10^3/UL (ref 0–0)
NUCLEATED RED BLOOD CELLS%: 0 /100WBC (ref 0–0)
PLATELET COUNT: 160 10^3/UL (ref 140–415)
POTASSIUM: 3.8 MMOL/L (ref 3.5–5.1)
RED BLOOD COUNT: 4.49 10^6/UL (ref 4.2–5.4)
RED CELL DISTRIBUTION WIDTH: 13.2 % (ref 11.5–14.5)
SODIUM: 143 MMOL/L (ref 135–144)
TOTAL PROTEIN: 6.8 G/DL (ref 6.1–8.1)
WHITE BLOOD COUNT: 4.9 10^3/UL (ref 4.8–10.8)

## 2019-04-04 RX ADMIN — PIPERACILLIN SODIUM AND TAZOBACTAM SODIUM SCH MLS/HR: 3; .375 INJECTION, POWDER, LYOPHILIZED, FOR SOLUTION INTRAVENOUS at 23:20

## 2019-04-04 RX ADMIN — PIPERACILLIN SODIUM AND TAZOBACTAM SODIUM 1 MLS/HR: 3; .375 INJECTION, POWDER, LYOPHILIZED, FOR SOLUTION INTRAVENOUS at 23:20

## 2019-04-04 RX ADMIN — PIPERACILLIN SODIUM AND TAZOBACTAM SODIUM SCH MLS/HR: 3; .375 INJECTION, POWDER, LYOPHILIZED, FOR SOLUTION INTRAVENOUS at 11:41

## 2019-04-04 RX ADMIN — INSULIN ASPART 1 UNIT: 100 INJECTION, SOLUTION INTRAVENOUS; SUBCUTANEOUS at 20:29

## 2019-04-04 RX ADMIN — HYDROMORPHONE HYDROCHLORIDE PRN MG: 1 INJECTION, SOLUTION INTRAMUSCULAR; INTRAVENOUS; SUBCUTANEOUS at 10:23

## 2019-04-04 RX ADMIN — INSULIN ASPART 1 UNIT: 100 INJECTION, SOLUTION INTRAVENOUS; SUBCUTANEOUS at 17:44

## 2019-04-04 RX ADMIN — INSULIN ASPART 1 UNIT: 100 INJECTION, SOLUTION INTRAVENOUS; SUBCUTANEOUS at 01:45

## 2019-04-04 RX ADMIN — PIPERACILLIN SODIUM AND TAZOBACTAM SODIUM SCH MLS/HR: 3; .375 INJECTION, POWDER, LYOPHILIZED, FOR SOLUTION INTRAVENOUS at 05:17

## 2019-04-04 RX ADMIN — ASCORBIC ACID, VITAMIN A PALMITATE, CHOLECALCIFEROL, THIAMINE HYDROCHLORIDE, RIBOFLAVIN-5 PHOSPHATE SODIUM, PYRIDOXINE HYDROCHLORIDE, NIACINAMIDE, DEXPANTHENOL, ALPHA-TOCOPHEROL ACETATE, VITAMIN K1, FOLIC ACID, BIOTIN, CYANOCOBALAMIN 1 MLS/HR: 200; 3300; 200; 6; 3.6; 6; 40; 15; 10; 150; 600; 60; 5 INJECTION, SOLUTION INTRAVENOUS at 12:41

## 2019-04-04 RX ADMIN — FOLIC ACID SCH MLS/HR: 5 INJECTION, SOLUTION INTRAMUSCULAR; INTRAVENOUS; SUBCUTANEOUS at 12:41

## 2019-04-04 RX ADMIN — ASCORBIC ACID, VITAMIN A PALMITATE, CHOLECALCIFEROL, THIAMINE HYDROCHLORIDE, RIBOFLAVIN-5 PHOSPHATE SODIUM, PYRIDOXINE HYDROCHLORIDE, NIACINAMIDE, DEXPANTHENOL, ALPHA-TOCOPHEROL ACETATE, VITAMIN K1, FOLIC ACID, BIOTIN, CYANOCOBALAMIN 1 MLS/HR: 200; 3300; 200; 6; 3.6; 6; 40; 15; 10; 150; 600; 60; 5 INJECTION, SOLUTION INTRAVENOUS at 00:19

## 2019-04-04 RX ADMIN — INSULIN GLARGINE SCH UNITS: 100 INJECTION, SOLUTION SUBCUTANEOUS at 09:26

## 2019-04-04 RX ADMIN — INSULIN ASPART 1 UNIT: 100 INJECTION, SOLUTION INTRAVENOUS; SUBCUTANEOUS at 05:00

## 2019-04-04 RX ADMIN — PIPERACILLIN SODIUM AND TAZOBACTAM SODIUM 1 MLS/HR: 3; .375 INJECTION, POWDER, LYOPHILIZED, FOR SOLUTION INTRAVENOUS at 05:17

## 2019-04-04 RX ADMIN — INSULIN GLARGINE 1 UNITS: 100 INJECTION, SOLUTION SUBCUTANEOUS at 09:26

## 2019-04-04 RX ADMIN — HYDROMORPHONE HYDROCHLORIDE PRN MG: 1 INJECTION, SOLUTION INTRAMUSCULAR; INTRAVENOUS; SUBCUTANEOUS at 02:02

## 2019-04-04 RX ADMIN — HYDROMORPHONE HYDROCHLORIDE 1 MG: 1 INJECTION, SOLUTION INTRAMUSCULAR; INTRAVENOUS; SUBCUTANEOUS at 10:23

## 2019-04-04 RX ADMIN — HYDROMORPHONE HYDROCHLORIDE 1 MG: 1 INJECTION, SOLUTION INTRAMUSCULAR; INTRAVENOUS; SUBCUTANEOUS at 17:40

## 2019-04-04 RX ADMIN — PIPERACILLIN SODIUM AND TAZOBACTAM SODIUM 1 MLS/HR: 3; .375 INJECTION, POWDER, LYOPHILIZED, FOR SOLUTION INTRAVENOUS at 17:13

## 2019-04-04 RX ADMIN — HYDROMORPHONE HYDROCHLORIDE PRN MG: 1 INJECTION, SOLUTION INTRAMUSCULAR; INTRAVENOUS; SUBCUTANEOUS at 05:57

## 2019-04-04 RX ADMIN — PIPERACILLIN SODIUM AND TAZOBACTAM SODIUM SCH MLS/HR: 3; .375 INJECTION, POWDER, LYOPHILIZED, FOR SOLUTION INTRAVENOUS at 17:13

## 2019-04-04 RX ADMIN — INSULIN ASPART 1 UNIT: 100 INJECTION, SOLUTION INTRAVENOUS; SUBCUTANEOUS at 12:58

## 2019-04-04 RX ADMIN — PANTOPRAZOLE SODIUM SCH MG: 40 INJECTION, POWDER, FOR SOLUTION INTRAVENOUS at 05:19

## 2019-04-04 RX ADMIN — INSULIN ASPART 1 UNIT: 100 INJECTION, SOLUTION INTRAVENOUS; SUBCUTANEOUS at 09:20

## 2019-04-04 RX ADMIN — FOLIC ACID SCH MLS/HR: 5 INJECTION, SOLUTION INTRAMUSCULAR; INTRAVENOUS; SUBCUTANEOUS at 00:19

## 2019-04-04 RX ADMIN — HYDROMORPHONE HYDROCHLORIDE 1 MG: 1 INJECTION, SOLUTION INTRAMUSCULAR; INTRAVENOUS; SUBCUTANEOUS at 05:57

## 2019-04-04 RX ADMIN — PANTOPRAZOLE SODIUM 1 MG: 40 INJECTION, POWDER, FOR SOLUTION INTRAVENOUS at 05:19

## 2019-04-04 RX ADMIN — HYDROMORPHONE HYDROCHLORIDE PRN MG: 1 INJECTION, SOLUTION INTRAMUSCULAR; INTRAVENOUS; SUBCUTANEOUS at 17:40

## 2019-04-04 RX ADMIN — HYDROMORPHONE HYDROCHLORIDE 1 MG: 1 INJECTION, SOLUTION INTRAMUSCULAR; INTRAVENOUS; SUBCUTANEOUS at 02:02

## 2019-04-04 RX ADMIN — PIPERACILLIN SODIUM AND TAZOBACTAM SODIUM 1 MLS/HR: 3; .375 INJECTION, POWDER, LYOPHILIZED, FOR SOLUTION INTRAVENOUS at 11:41

## 2019-04-04 NOTE — PN
Date/Time of Note


Date/Time of Note


DATE: 4/4/19 


TIME: 10:58





Assessment/Plan


VTE Prophylaxis


Risk score (from Ns)>0 risk:  2


SCD applied (from Ns):  Yes


Pharmacological prophylaxis:  NA/contraindicated


Pharm contraindication:  low risk/ambulating





Lines/Catheters


IV Catheter Type (from [facility]):  Peripheral IV


Urinary Cath still in place:  No





Assessment/Plan


Hospital Course


SUBJECTIVE: Lying in bed, with improved right-sided abdominal pain.  Had regular


bowel movements.  No nausea or vomiting.








OBJECTIVE: Vital signs-see below








PHYSICAL EXAM:


Constitutional: Well-developed, adequately built, lying in bed comfortably.


Psych:  nl mood/affect, no complaints


Head:  atraumatic, normocephalic


Eyes:  nl conjunctiva, nl sclera


ENMT:  mucosa pink and moist, nl external ears & nose


Neck:  non-tender, supple


Respiratory:  clear to auscultation, normal air movement


Cardiovascular:  nl pulses, regular rate and rhythm


Gastrointestinal:   Mild tenderness to right upper and lower quadrant.  .soft, 


bowel sounds active in all 4 quadrants.


Musculoskeletal/extremities:Nl extremities to inspection, motor strength equal 


bilaterally, no focal deficit. Normal pulses,no cyanosis, no edema.


Neurological: Alert oriented 3,nl speech, nl strength


Skin:  nl turgor





ASSESSMENT/PLAN: 58-year-old female with diabetes, admitted with RLQ abdominal 


pain associated with nonbilious/nonbloody vomiting times 4-day duration found to


have gallstone pancreatitis.





1.  Gallstone pancreatitis.


-Resolving nicely.


-If no plan for ERCP or cholecystectomy, will start patient on diet.  Follow-up 


GI and surgery recommendations.


-Continue trending up lipase and LFTs.





2.  Cholelithiasis with probable cholecystitis.


-Surgery following and will follow up their recommendations.  Patient eventually


needs cholecystectomy currently decision pending whether this is inpatient 


versus outpatient.





3. CBD dilatation 10 mm in size, no choledocholithiasis identified an MRI


- GI following and possibly needs ERCP


-Bilirubinemia resolved.





 4.DMII


-Stable glycemic trends.  Continue insulin regimen.





5.  Fatty liver.


-Lifestyle changes advised.





DVT prophylaxis: SCDs/ambulation.


PUD prophylaxis: Not indicated


CODE STATUS: Full code


Diet: N.p.o. until cleared from GI/surgery standpoint.





Disposition: Continue current management.  Overall patient with resolving 


lipase.  At this time, decision pending whether patient needs ERCP versus 


cholecystectomy as inpatient.  We will start patient on a diet if there is no 


surgical/procedural plan.





Patient was seen in collaboration with Dr. Elizondo.


Result Diagram:  


4/4/19 0418                                                                     


          4/4/19 0418





Results 24hrs





Laboratory Tests


Test
                     4/3/19
13:00  4/3/19
17:55  4/3/19
21:23  4/4/19
01:33


Bedside Glucose                  149           147           143           166


Test
                     4/4/19
04:18  4/4/19
05:16  4/4/19
08:55  



White Blood Count               4.9  #


Red Blood Count                 4.49


Hemoglobin                      12.3


Hematocrit                      38.8


Mean Corpuscular Volume         86.4


Mean Corpuscular               27.4  L


Hemoglobin


Mean Corpuscular              31.7  L
  
             
             



Hemoglobin
Concent


Red Cell Distribution           13.2


Width


Platelet Count                   160


Mean Platelet Volume           10.5  H


Immature Granulocytes %       0.600  H


Neutrophils %                   51.0


Lymphocytes %                   36.3


Monocytes %                      9.9


Eosinophils %                    1.8


Basophils %                      0.4


Nucleated Red Blood              0.0


Cells %


Immature Granulocytes #        0.030


Neutrophils #                    2.5


Lymphocytes #                    1.8


Monocytes #                      0.5


Eosinophils #                    0.1


Basophils #                      0.0


Nucleated Red Blood              0.0


Cells #


Sodium Level                     143


Potassium Level                  3.8


Chloride Level                   106


Carbon Dioxide Level              27


Anion Gap                         10


Blood Urea Nitrogen                9


Creatinine                     0.43  L


Est Glomerular Filtrat    > 60  
       
             
             



Rate
mL/min


Glucose Level                    146


Calcium Level                    9.3


Magnesium Level                  2.2


Total Bilirubin                  0.6


Direct Bilirubin                0.00


Indirect Bilirubin               0.6


Aspartate Amino                 58  H
  
             
             



Transf
(AST/SGOT)


Alanine                        130  H
  
             
             



Aminotransferase
(ALT/SG


PT)


Alkaline Phosphatase            241  H


Total Protein                    6.8


Albumin                          3.6


Globulin                        3.20


Albumin/Globulin Ratio          1.12


Lipase                          459  H


Bedside Glucose                                133           161








Exam/Review of Systems


Exam


Vitals





Vital Signs


  Date      Temp  Pulse  Resp  B/P (MAP)   Pulse Ox  O2          O2 Flow    FiO2


Time                                                 Delivery    Rate


    4/4/19  98.3     78    18      108/54        94  Room Air


     07:55                           (72)








Intake and Output





4/3/19


4/3/19


4/4/19





1515:00


23:00


07:00





IntakeIntake Total


500 ml


500 ml


1080 ml





BalanceBalance


500 ml


500 ml


1080 ml














Results


Results 24hrs





Laboratory Tests


Test
                     4/3/19
13:00  4/3/19
17:55  4/3/19
21:23  4/4/19
01:33


Bedside Glucose                  149           147           143           166


Test
                     4/4/19
04:18  4/4/19
05:16  4/4/19
08:55  



White Blood Count               4.9  #


Red Blood Count                 4.49


Hemoglobin                      12.3


Hematocrit                      38.8


Mean Corpuscular Volume         86.4


Mean Corpuscular               27.4  L


Hemoglobin


Mean Corpuscular              31.7  L
  
             
             



Hemoglobin
Concent


Red Cell Distribution           13.2


Width


Platelet Count                   160


Mean Platelet Volume           10.5  H


Immature Granulocytes %       0.600  H


Neutrophils %                   51.0


Lymphocytes %                   36.3


Monocytes %                      9.9


Eosinophils %                    1.8


Basophils %                      0.4


Nucleated Red Blood              0.0


Cells %


Immature Granulocytes #        0.030


Neutrophils #                    2.5


Lymphocytes #                    1.8


Monocytes #                      0.5


Eosinophils #                    0.1


Basophils #                      0.0


Nucleated Red Blood              0.0


Cells #


Sodium Level                     143


Potassium Level                  3.8


Chloride Level                   106


Carbon Dioxide Level              27


Anion Gap                         10


Blood Urea Nitrogen                9


Creatinine                     0.43  L


Est Glomerular Filtrat    > 60  
       
             
             



Rate
mL/min


Glucose Level                    146


Calcium Level                    9.3


Magnesium Level                  2.2


Total Bilirubin                  0.6


Direct Bilirubin                0.00


Indirect Bilirubin               0.6


Aspartate Amino                 58  H
  
             
             



Transf
(AST/SGOT)


Alanine                        130  H
  
             
             



Aminotransferase
(ALT/SG


PT)


Alkaline Phosphatase            241  H


Total Protein                    6.8


Albumin                          3.6


Globulin                        3.20


Albumin/Globulin Ratio          1.12


Lipase                          459  H


Bedside Glucose                                133           161








Medications


Medication





Current Medications


IV Flush (NS 3 ml) 3 ml PER PROTOCOL IV ;  Start 4/3/19 at 03:00


Ondansetron HCl (Zofran Inj) 4 mg Q6H  PRN IV NAUSEA/VOMITING;  Start 4/3/19 at 


03:00


Acetaminophen (Tylenol Tab) 650 mg Q6H  PRN PO .PAIN 1-3 OR TEMP;  Start 4/3/19 


at 03:00


Docusate Sodium (Colace) 100 mg Q12H  PRN PO .CONSTIPATION;  Start 4/3/19 at 


03:00


Bisacodyl (Dulcolax) 5 mg DAILY  PRN PO .CONSTIPATION;  Start 4/3/19 at 03:00


Diagnostic Test (Pha) (Accu-Chek) 1 ea 02 XX ;  Start 4/4/19 at 02:00


Insulin Aspart (Novolog Insulin Pen) NOVOLOG *MILD* ALGORI... Q4 SC  Last 


administered on 4/4/19at 09:20; Admin Dose 1 UNIT;  Start 4/3/19 at 09:00


Miscellaneous Information 1 ea NOTE XX ;  Start 4/3/19 at 09:00


Glucose (Glutose) 15 gm Q15M  PRN PO DECREASED GLUCOSE;  Start 4/3/19 at 09:00


Glucose (Glutose) 22.5 gm Q15M  PRN PO DECREASED GLUCOSE;  Start 4/3/19 at 09:00


Dextrose (D50w Syringe) 25 ml Q15M  PRN IV DECREASED GLUCOSE;  Start 4/3/19 at 


09:00


Dextrose (D50w Syringe) 50 ml Q15M  PRN IV DECREASED GLUCOSE;  Start 4/3/19 at 


09:00


Glucagon (Glucagen) 1 mg Q15M  PRN IM DECREASED GLUCOSE;  Start 4/3/19 at 09:00


Glucose (Glutose) 15 gm Q15M  PRN BUCCAL DECREASED GLUCOSE;  Start 4/3/19 at 


09:00


Hydromorphone HCl (Dilaudid) 1 mg Q4H  PRN IV .SEVERE PAIN 7-10 Last administere


d on 4/4/19at 10:23; Admin Dose 1 MG;  Start 4/3/19 at 11:00


Dextrose/Sodium Chloride 1,000 ml @  80 mls/hr [id number] IV  Last administered on 


4/4/19at 00:19; Admin Dose 80 MLS/HR;  Start 4/3/19 at 10:30


Insulin Glargine (Lantus) 9 units DAILY@0800 SC  Last administered on 4/4/19at 


09:26; Admin Dose 9 UNITS;  Start 4/3/19 at 10:30


Pantoprazole (Protonix Iv) 40 mg DAILY@06 IV  Last administered on 4/4/19at 


05:19; Admin Dose 40 MG;  Start 4/3/19 at 10:30


Piperacillin Sod/ Tazobactam Sod 100 ml @  200 mls/hr Q6 IVPB  Last administered


on 4/4/19at 05:17; Admin Dose 200 MLS/HR;  Start 4/4/19 at 06:00











JOSÉ MANUEL HULL NP                Apr 4, 2019 11:00

## 2019-04-04 NOTE — PN
Date/Time of Note


Date/Time of Note


DATE: 4/4/19 


TIME: 11:20





Assessment/Plan


Lines/Catheters


IV Catheter Type (from [facility]):  Peripheral IV


[place] in Place (from [facility]):  No





Assessment/Plan


Chief Complaint/Hosp Course


1.  Abdominal pain: Improved


2.  Nausea vomiting: Resolved


3.  Gallstones


4.  Pancreatitis: Likely secondary to #3: Improving


5.  Transaminitis: Improved


6.  Hyperbilirubinemia: Improved; possible cholecystitis


-Judicious IV fluid


-Trend labs


-GI consult noted> possible ERCP


-MRCP noted with dilated CBD


-Diet resumption okay from surgical standpoint > low-fat low-cholesterol diet


-Outpatient follow-up for cholecystectomy, had long discussion with family. 


family and patient would prefer to have outpatient surgery in [facility] where 


the patient resides.  The patient is planning to fly back on the 17th of this 


month.  She was instructed to follow-up with primary care and surgeon when she 


returns ASAP.


7.  Gastritis


-Diet and lifestyle optimization


-Antacids





Thank you.  Patient seen and examined in collaboration with Dr. Paramjit Brizuela.





Subjective


24 Hr Interval Summary


Feels better. Improved abdominal pain.  LFTs and lipase improving.  No fevers, 


chills, sob, congested cough, cp, palpitations, ha, dizziness, nausea, vomiting,


diarrhea, dysuria.





Exam/Review of Systems


Vital Signs


Vitals





Vital Signs


  Date      Temp  Pulse  Resp  B/P (MAP)   Pulse Ox  O2          O2 Flow    FiO2


Time                                                 Delivery    Rate


    4/4/19  98.3     78    18      108/54        94  Room Air


     07:55                           (72)








Intake and Output





4/3/19


4/3/19


4/4/19





1515:00


23:00


07:00





IntakeIntake Total


500 ml


500 ml


1080 ml





BalanceBalance


500 ml


500 ml


1080 ml














Exam


Free Text/Dictation


Constitutional:  alert, oriented; 


   No distress


Psych:  nl mood/affect; 


   No anxiety


Head:  normocephalic, atraumatic


Eyes:  nl conjunctiva, EOMI, PERRL


ENMT:  nl external ears & nose, mucosa pink and moist


Neck:  supple, non-tender; 


   No jvd


Respiratory:  normal air movement; 


   No congested cough, No labored breathing


Cardiovascular:  regular rate and rhythm; 


   No edema


Gastrointestinal:  soft, tender (Minimalimproved); 


   No distended, No rebound or guarding


Musculoskeletal:  nl extremities to inspection; 


   No joint tenderness


Extremities:  normal pulses; 


   No calf tenderness, No cyanosis


Neurological:  nl mental status, nl speech, nl strength


Skin:  nl turgor; 


   No rash or lesions, No diaphoresis


Lymph:  nl lymph nodes





Results


Result Diagram:  


4/4/19 0418                                                                     


          4/4/19 0418














LONA RAMSEY NP        Apr 4, 2019 11:31

## 2019-04-04 NOTE — PN
Date/Time of Note


Date/Time of Note


DATE: 4/4/19 


TIME: 13:23





Assessment/Plan


VTE Prophylaxis


Risk score (from Ns)>0 risk:  2


SCD applied (from Ns):  Yes


Pharmacological prophylaxis:  other (scds)





Lines/Catheters


IV Catheter Type (from [facility]):  Peripheral IV


Urinary Cath still in place:  No





Assessment/Plan


Hospital Course


Summary Assessment and Plan:


Assessment:


Acute pancreatitis likely secondary to cholelithiasis


   -MRCP- Mild common bile duct dilatation up to 10 mm. No choledocholithiasis 


is seen.


Cholelithiasis


Elevated LFTs-trending down


   -Hepatitis serology pending


Indirect hyperbilirubinemia-resolved





Plan:


Reviewed results with Dr. Cheng- likely stone passage no plan for ERCP at this 


time- as patient is improving and no obstruction noted


Pt was offered cholecystectomy, would prefer to go home and have surgery 


completed there- per surgery 


Pt continues to c/o pain 8/10- recommend keeping patient NPO with IVF


Supportive care 


Patient seen in collaboration with Dr. Cheng/aleksey








 Subjective:


Course reviewed with nursing staff


Patient interviewed and examined


All labs, imaging and other results reviewed





The patient resting in bed, continues to have upper abd pain


8/10, currently receiving Dilaudid for pain.


c/o some nausea no vomiting 








PHYSICAL EXAMINATION:


GENERAL: Well developed, well nourished, alert & oriented x 3


SKIN: No lesions


EYES: Pupils equal reactive to light,  no discharge.


EARS/NOSE AND THROAT: Ears normal, nose normal


NECK: Supple, no masses


CHEST: Inspection within normal limits.


CARDIOVASCULAR: Heart: Regular rate and rhythm


RESPIRATORY: Lungs clear to auscultation 


GASTROINTESTINAL AND LIVER: Abdomen: Soft, LUQ pain 8/10, non-distended, no 


hernias, no masses, no organomegaly, no ascites, no guarding, no rebound 


tenderness, normoactive bowel sounds. Rectal: Deferred. 


EXTREMITIES: No cyanosis, clubbing or edema.


Result Diagram:  


4/4/19 0418                                                                     


          4/4/19 0418





Results 24hrs





Laboratory Tests


Test
                     4/3/19
17:55  4/3/19
21:23  4/4/19
01:33  4/4/19
04:18


Bedside Glucose                  147           143           166


White Blood Count                                                         4.9  #


Red Blood Count                                                           4.49


Hemoglobin                                                                12.3


Hematocrit                                                                38.8


Mean Corpuscular Volume                                                   86.4


Mean Corpuscular                                                         27.4  L


Hemoglobin


Mean Corpuscular          
             
             
                 31.7  L



Hemoglobin
Concent


Red Cell Distribution                                                     13.2


Width


Platelet Count                                                             160


Mean Platelet Volume                                                     10.5  H


Immature Granulocytes %                                                 0.600  H


Neutrophils %                                                             51.0


Lymphocytes %                                                             36.3


Monocytes %                                                                9.9


Eosinophils %                                                              1.8


Basophils %                                                                0.4


Nucleated Red Blood                                                        0.0


Cells %


Immature Granulocytes #                                                  0.030


Neutrophils #                                                              2.5


Lymphocytes #                                                              1.8


Monocytes #                                                                0.5


Eosinophils #                                                              0.1


Basophils #                                                                0.0


Nucleated Red Blood                                                        0.0


Cells #


Sodium Level                                                               143


Potassium Level                                                            3.8


Chloride Level                                                             106


Carbon Dioxide Level                                                        27


Anion Gap                                                                   10


Blood Urea Nitrogen                                                          9


Creatinine                                                               0.43  L


Est Glomerular Filtrat    
             
             
             > 60  



Rate
mL/min


Glucose Level                                                              146


Calcium Level                                                              9.3


Magnesium Level                                                            2.2


Total Bilirubin                                                            0.6


Direct Bilirubin                                                          0.00


Indirect Bilirubin                                                         0.6


Aspartate Amino           
             
             
                   58  H



Transf
(AST/SGOT)


Alanine                   
             
             
                  130  H



Aminotransferase
(ALT/SG


PT)


Alkaline Phosphatase                                                      241  H


Total Protein                                                              6.8


Albumin                                                                    3.6


Globulin                                                                  3.20


Albumin/Globulin Ratio                                                    1.12


Lipase                                                                    459  H


Test
                     4/4/19
05:16  4/4/19
08:55  4/4/19
12:38  



Bedside Glucose                  133           161           147








Exam/Review of Systems


Exam


Vitals





Vital Signs


  Date      Temp  Pulse  Resp  B/P (MAP)   Pulse Ox  O2          O2 Flow    FiO2


Time                                                 Delivery    Rate


    4/4/19  98.3     78    18      108/54        94  Room Air


     07:55                           (72)








Intake and Output





4/3/19


4/3/19


4/4/19





1515:00


23:00


07:00





IntakeIntake Total


500 ml


500 ml


1080 ml





BalanceBalance


500 ml


500 ml


1080 ml














Results


Results 24hrs





Laboratory Tests


Test
                     4/3/19
17:55  4/3/19
21:23  4/4/19
01:33  4/4/19
04:18


Bedside Glucose                  147           143           166


White Blood Count                                                         4.9  #


Red Blood Count                                                           4.49


Hemoglobin                                                                12.3


Hematocrit                                                                38.8


Mean Corpuscular Volume                                                   86.4


Mean Corpuscular                                                         27.4  L


Hemoglobin


Mean Corpuscular          
             
             
                 31.7  L



Hemoglobin
Concent


Red Cell Distribution                                                     13.2


Width


Platelet Count                                                             160


Mean Platelet Volume                                                     10.5  H


Immature Granulocytes %                                                 0.600  H


Neutrophils %                                                             51.0


Lymphocytes %                                                             36.3


Monocytes %                                                                9.9


Eosinophils %                                                              1.8


Basophils %                                                                0.4


Nucleated Red Blood                                                        0.0


Cells %


Immature Granulocytes #                                                  0.030


Neutrophils #                                                              2.5


Lymphocytes #                                                              1.8


Monocytes #                                                                0.5


Eosinophils #                                                              0.1


Basophils #                                                                0.0


Nucleated Red Blood                                                        0.0


Cells #


Sodium Level                                                               143


Potassium Level                                                            3.8


Chloride Level                                                             106


Carbon Dioxide Level                                                        27


Anion Gap                                                                   10


Blood Urea Nitrogen                                                          9


Creatinine                                                               0.43  L


Est Glomerular Filtrat    
             
             
             > 60  



Rate
mL/min


Glucose Level                                                              146


Calcium Level                                                              9.3


Magnesium Level                                                            2.2


Total Bilirubin                                                            0.6


Direct Bilirubin                                                          0.00


Indirect Bilirubin                                                         0.6


Aspartate Amino           
             
             
                   58  H



Transf
(AST/SGOT)


Alanine                   
             
             
                  130  H



Aminotransferase
(ALT/SG


PT)


Alkaline Phosphatase                                                      241  H


Total Protein                                                              6.8


Albumin                                                                    3.6


Globulin                                                                  3.20


Albumin/Globulin Ratio                                                    1.12


Lipase                                                                    459  H


Test
                     4/4/19
05:16  4/4/19
08:55  4/4/19
12:38  



Bedside Glucose                  133           161           147








Medications


Medication





Current Medications


IV Flush (NS 3 ml) 3 ml PER PROTOCOL IV ;  Start 4/3/19 at 03:00


Ondansetron HCl (Zofran Inj) 4 mg Q6H  PRN IV NAUSEA/VOMITING;  Start 4/3/19 at 


03:00


Acetaminophen (Tylenol Tab) 650 mg Q6H  PRN PO .PAIN 1-3 OR TEMP;  Start 4/3/19 


at 03:00


Docusate Sodium (Colace) 100 mg Q12H  PRN PO .CONSTIPATION;  Start 4/3/19 at 


03:00


Bisacodyl (Dulcolax) 5 mg DAILY  PRN PO .CONSTIPATION;  Start 4/3/19 at 03:00


Diagnostic Test (Pha) (Accu-Chek) 1 ea 02 XX ;  Start 4/4/19 at 02:00


Insulin Aspart (Novolog Insulin Pen) NOVOLOG *MILD* ALGORI... Q4 SC  Last 


administered on 4/4/19at 12:58; Admin Dose 1 UNIT;  Start 4/3/19 at 09:00


Miscellaneous Information 1 ea NOTE XX ;  Start 4/3/19 at 09:00


Glucose (Glutose) 15 gm Q15M  PRN PO DECREASED GLUCOSE;  Start 4/3/19 at 09:00


Glucose (Glutose) 22.5 gm Q15M  PRN PO DECREASED GLUCOSE;  Start 4/3/19 at 09:00


Dextrose (D50w Syringe) 25 ml Q15M  PRN IV DECREASED GLUCOSE;  Start 4/3/19 at 


09:00


Dextrose (D50w Syringe) 50 ml Q15M  PRN IV DECREASED GLUCOSE;  Start 4/3/19 at 


09:00


Glucagon (Glucagen) 1 mg Q15M  PRN IM DECREASED GLUCOSE;  Start 4/3/19 at 09:00


Glucose (Glutose) 15 gm Q15M  PRN BUCCAL DECREASED GLUCOSE;  Start 4/3/19 at 0


9:00


Hydromorphone HCl (Dilaudid) 1 mg Q4H  PRN IV .SEVERE PAIN 7-10 Last 


administered on 4/4/19at 10:23; Admin Dose 1 MG;  Start 4/3/19 at 11:00


Dextrose/Sodium Chloride 1,000 ml @  80 mls/hr [id number] IV  Last administered on 


4/4/19at 12:41; Admin Dose 80 MLS/HR;  Start 4/3/19 at 10:30


Insulin Glargine (Lantus) 9 units DAILY@0800 SC  Last administered on 4/4/19at 


09:26; Admin Dose 9 UNITS;  Start 4/3/19 at 10:30


Pantoprazole (Protonix Iv) 40 mg DAILY@06 IV  Last administered on 4/4/19at 


05:19; Admin Dose 40 MG;  Start 4/3/19 at 10:30


Piperacillin Sod/ Tazobactam Sod 100 ml @  200 mls/hr Q6 IVPB  Last administered


on 4/4/19at 11:41; Admin Dose 200 MLS/HR;  Start 4/4/19 at 06:00











EMMETT DUQUE              Apr 4, 2019 13:25

## 2019-04-05 VITALS — RESPIRATION RATE: 18 BRPM | SYSTOLIC BLOOD PRESSURE: 116 MMHG | HEART RATE: 67 BPM | DIASTOLIC BLOOD PRESSURE: 54 MMHG

## 2019-04-05 VITALS — DIASTOLIC BLOOD PRESSURE: 74 MMHG | RESPIRATION RATE: 17 BRPM | SYSTOLIC BLOOD PRESSURE: 120 MMHG | HEART RATE: 75 BPM

## 2019-04-05 VITALS — SYSTOLIC BLOOD PRESSURE: 126 MMHG | DIASTOLIC BLOOD PRESSURE: 61 MMHG | HEART RATE: 65 BPM | RESPIRATION RATE: 18 BRPM

## 2019-04-05 LAB
ADD MAN DIFF?: NO
ALANINE AMINOTRANSFERASE: 91 IU/L (ref 13–69)
ALBUMIN/GLOBULIN RATIO: 1.06
ALBUMIN: 3.4 G/DL (ref 3.3–4.9)
ALKALINE PHOSPHATASE: 236 IU/L (ref 42–121)
AMYLASE: 63 U/L (ref 11–123)
ANION GAP: 8 (ref 5–13)
ASPARTATE AMINO TRANSFERASE: 42 IU/L (ref 15–46)
BASOPHIL #: 0 10^3/UL (ref 0–0.1)
BASOPHILS %: 0.4 % (ref 0–2)
BILIRUBIN,DIRECT: 0 MG/DL (ref 0–0.2)
BILIRUBIN,TOTAL: 0.8 MG/DL (ref 0.2–1.3)
BLOOD UREA NITROGEN: 7 MG/DL (ref 7–20)
CALCIUM: 9.3 MG/DL (ref 8.4–10.2)
CARBON DIOXIDE: 26 MMOL/L (ref 21–31)
CHLORIDE: 107 MMOL/L (ref 97–110)
CREATININE: 0.45 MG/DL (ref 0.44–1)
EOSINOPHILS #: 0.2 10^3/UL (ref 0–0.5)
EOSINOPHILS %: 3.1 % (ref 0–7)
GLOBULIN: 3.2 G/DL (ref 1.3–3.2)
GLUCOSE: 148 MG/DL (ref 70–220)
HEMATOCRIT: 37.6 % (ref 37–47)
HEMOGLOBIN: 12.1 G/DL (ref 12–16)
IMMATURE GRANS #M: 0.02 10^3/UL (ref 0–0.03)
IMMATURE GRANS % (M): 0.4 % (ref 0–0.43)
LIPASE: 308 U/L (ref 23–300)
LYMPHOCYTES #: 2.1 10^3/UL (ref 0.8–2.9)
LYMPHOCYTES %: 40.5 % (ref 15–51)
MEAN CORPUSCULAR HEMOGLOBIN: 27.4 PG (ref 29–33)
MEAN CORPUSCULAR HGB CONC: 32.2 G/DL (ref 32–37)
MEAN CORPUSCULAR VOLUME: 85.3 FL (ref 82–101)
MEAN PLATELET VOLUME: 10.2 FL (ref 7.4–10.4)
MONOCYTE #: 0.5 10^3/UL (ref 0.3–0.9)
MONOCYTES %: 9.4 % (ref 0–11)
NEUTROPHIL #: 2.4 10^3/UL (ref 1.6–7.5)
NEUTROPHILS %: 46.2 % (ref 39–77)
NUCLEATED RED BLOOD CELLS #: 0 10^3/UL (ref 0–0)
NUCLEATED RED BLOOD CELLS%: 0 /100WBC (ref 0–0)
PLATELET COUNT: 159 10^3/UL (ref 140–415)
POTASSIUM: 3.5 MMOL/L (ref 3.5–5.1)
RED BLOOD COUNT: 4.41 10^6/UL (ref 4.2–5.4)
RED CELL DISTRIBUTION WIDTH: 12.9 % (ref 11.5–14.5)
SODIUM: 141 MMOL/L (ref 135–144)
TOTAL PROTEIN: 6.6 G/DL (ref 6.1–8.1)
WHITE BLOOD COUNT: 5.2 10^3/UL (ref 4.8–10.8)

## 2019-04-05 RX ADMIN — PANTOPRAZOLE SODIUM 1 MG: 40 INJECTION, POWDER, FOR SOLUTION INTRAVENOUS at 05:19

## 2019-04-05 RX ADMIN — FOLIC ACID SCH MLS/HR: 5 INJECTION, SOLUTION INTRAMUSCULAR; INTRAVENOUS; SUBCUTANEOUS at 01:24

## 2019-04-05 RX ADMIN — PIPERACILLIN SODIUM AND TAZOBACTAM SODIUM SCH MLS/HR: 3; .375 INJECTION, POWDER, LYOPHILIZED, FOR SOLUTION INTRAVENOUS at 11:45

## 2019-04-05 RX ADMIN — PIPERACILLIN SODIUM AND TAZOBACTAM SODIUM SCH MLS/HR: 3; .375 INJECTION, POWDER, LYOPHILIZED, FOR SOLUTION INTRAVENOUS at 05:19

## 2019-04-05 RX ADMIN — PANTOPRAZOLE SODIUM SCH MG: 40 INJECTION, POWDER, FOR SOLUTION INTRAVENOUS at 05:19

## 2019-04-05 RX ADMIN — INSULIN ASPART 1 UNIT: 100 INJECTION, SOLUTION INTRAVENOUS; SUBCUTANEOUS at 09:04

## 2019-04-05 RX ADMIN — ASCORBIC ACID, VITAMIN A PALMITATE, CHOLECALCIFEROL, THIAMINE HYDROCHLORIDE, RIBOFLAVIN-5 PHOSPHATE SODIUM, PYRIDOXINE HYDROCHLORIDE, NIACINAMIDE, DEXPANTHENOL, ALPHA-TOCOPHEROL ACETATE, VITAMIN K1, FOLIC ACID, BIOTIN, CYANOCOBALAMIN 1 MLS/HR: 200; 3300; 200; 6; 3.6; 6; 40; 15; 10; 150; 600; 60; 5 INJECTION, SOLUTION INTRAVENOUS at 01:24

## 2019-04-05 RX ADMIN — INSULIN ASPART 1 UNIT: 100 INJECTION, SOLUTION INTRAVENOUS; SUBCUTANEOUS at 05:28

## 2019-04-05 RX ADMIN — PIPERACILLIN SODIUM AND TAZOBACTAM SODIUM 1 MLS/HR: 3; .375 INJECTION, POWDER, LYOPHILIZED, FOR SOLUTION INTRAVENOUS at 05:19

## 2019-04-05 RX ADMIN — INSULIN GLARGINE SCH UNITS: 100 INJECTION, SOLUTION SUBCUTANEOUS at 09:04

## 2019-04-05 RX ADMIN — INSULIN GLARGINE 1 UNITS: 100 INJECTION, SOLUTION SUBCUTANEOUS at 09:04

## 2019-04-05 RX ADMIN — INSULIN ASPART 1 UNIT: 100 INJECTION, SOLUTION INTRAVENOUS; SUBCUTANEOUS at 01:00

## 2019-04-05 RX ADMIN — PIPERACILLIN SODIUM AND TAZOBACTAM SODIUM 1 MLS/HR: 3; .375 INJECTION, POWDER, LYOPHILIZED, FOR SOLUTION INTRAVENOUS at 11:45

## 2019-04-05 RX ADMIN — INSULIN ASPART 1 UNIT: 100 INJECTION, SOLUTION INTRAVENOUS; SUBCUTANEOUS at 12:41

## 2019-04-05 NOTE — PN
Date/Time of Note


Date/Time of Note


DATE: 4/5/19 


TIME: 10:00





Assessment/Plan


VTE Prophylaxis


Risk score (from Nsg)>0 risk:  1


SCD applied (from Nsg):  Yes


Pharmacological prophylaxis:  NA/contraindicated


Pharm contraindication:  low risk/ambulating





Lines/Catheters


IV Catheter Type (from Nrsg):  Peripheral IV


Urinary Cath still in place:  No





Assessment/Plan


Hospital Course


SUBJECTIVE: No acute overnight episodes.  Patient with no further abdominal 


pain.








OBJECTIVE: Vital signs-see below








PHYSICAL EXAM:


Constitutional: Well-developed, adequately built, lying in bed comfortably.


Psych:  nl mood/affect, no complaints


Head:  atraumatic, normocephalic


Eyes:  nl conjunctiva, nl sclera


ENMT:  mucosa pink and moist, nl external ears & nose


Neck:  non-tender, supple


Respiratory:  clear to auscultation, normal air movement


Cardiovascular:  nl pulses, regular rate and rhythm


Gastrointestinal:   No further tenderness.  .soft, bowel sounds active in all 4 


quadrants.


Musculoskeletal/extremities:Nl extremities to inspection, motor strength equal 


bilaterally, no focal deficit. Normal pulses,no cyanosis, no edema.


Neurological: Alert oriented 3,nl speech, nl strength


Skin:  nl turgor





ASSESSMENT/PLAN: 58-year-old female with diabetes, admitted with RLQ abdominal p


ain associated with nonbilious/nonbloody vomiting times 4-day duration found to 


have gallstone pancreatitis.





1.  Gallstone pancreatitis.


-Symptoms resolved.  Lipase trended down.


-Start clear diet and advance as tolerated.





2.  Cholelithiasis with probable cholecystitis.


-Outpatient cholecystectomy recommended per surgery, for which patient opted to 


do it at [facility] that she resides.





3. CBD dilatation 10 mm in size, no choledocholithiasis identified an MRI, 


likely passing stone.


-Bilirubinemia resolved.  Symptoms resolved.


-No further GI intervention indicated.





 4.DMII


-Stable glycemic trends.  Continue insulin regimen.





5.  Fatty liver.


-Lifestyle changes advised.





DVT prophylaxis: SCDs/ambulation.


PUD prophylaxis: Not indicated


CODE STATUS: Full code


Diet: clear and advance as tolerated.





Disposition: Continue current management.  Start clear diet today and advance as


tolerated.  DC planning in 24 hours if patient able to tolerate a diet.





Patient was seen in collaboration with Dr. Elizondo.


Result Diagram:  


4/5/19 0415                                                                     


          4/5/19 0415





Results 24hrs





Laboratory Tests


Test
                     4/4/19
12:38  4/4/19
17:31  4/4/19
20:28  4/5/19
01:24


Bedside Glucose                  147           143           128           125


Test
                     4/5/19
04:15  4/5/19
05:24  4/5/19
08:13  



White Blood Count                5.2


Red Blood Count                 4.41


Hemoglobin                      12.1


Hematocrit                      37.6


Mean Corpuscular Volume         85.3


Mean Corpuscular               27.4  L


Hemoglobin


Mean Corpuscular               32.2  
  
             
             



Hemoglobin
Concent


Red Cell Distribution           12.9


Width


Platelet Count                   159


Mean Platelet Volume            10.2


Immature Granulocytes %        0.400


Neutrophils %                   46.2


Lymphocytes %                   40.5


Monocytes %                      9.4


Eosinophils %                    3.1


Basophils %                      0.4


Nucleated Red Blood              0.0


Cells %


Immature Granulocytes #        0.020


Neutrophils #                    2.4


Lymphocytes #                    2.1


Monocytes #                      0.5


Eosinophils #                    0.2


Basophils #                      0.0


Nucleated Red Blood              0.0


Cells #


Sodium Level                     141


Potassium Level                  3.5


Chloride Level                   107


Carbon Dioxide Level              26


Anion Gap                          8


Blood Urea Nitrogen                7


Creatinine                      0.45


Est Glomerular Filtrat    > 60  
       
             
             



Rate
mL/min


Glucose Level                    148


Calcium Level                    9.3


Total Bilirubin                  0.8


Direct Bilirubin                0.00


Indirect Bilirubin               0.8


Aspartate Amino                  42  
  
             
             



Transf
(AST/SGOT)


Alanine                         91  H
  
             
             



Aminotransferase
(ALT/SG


PT)


Alkaline Phosphatase            236  H


Total Protein                    6.6


Albumin                          3.4


Globulin                        3.20


Albumin/Globulin Ratio          1.06


Amylase Level                     63


Lipase                          308  H


Bedside Glucose                                147           171








Exam/Review of Systems


Exam


Vitals





Vital Signs


  Date      Temp  Pulse  Resp  B/P (MAP)   Pulse Ox  O2          O2 Flow    FiO2


Time                                                 Delivery    Rate


    4/5/19  98.0     67    18      116/54        99  Room Air


     08:25                           (74)








Intake and Output





4/4/19 4/4/19 4/5/19





1515:00


23:00


07:00





IntakeIntake Total


580 ml


420 ml


1080 ml





BalanceBalance


580 ml


420 ml


1080 ml














Results


Results 24hrs





Laboratory Tests


Test
                     4/4/19
12:38  4/4/19
17:31  4/4/19
20:28  4/5/19
01:24


Bedside Glucose                  147           143           128           125


Test
                     4/5/19
04:15  4/5/19
05:24  4/5/19
08:13  



White Blood Count                5.2


Red Blood Count                 4.41


Hemoglobin                      12.1


Hematocrit                      37.6


Mean Corpuscular Volume         85.3


Mean Corpuscular               27.4  L


Hemoglobin


Mean Corpuscular               32.2  
  
             
             



Hemoglobin
Concent


Red Cell Distribution           12.9


Width


Platelet Count                   159


Mean Platelet Volume            10.2


Immature Granulocytes %        0.400


Neutrophils %                   46.2


Lymphocytes %                   40.5


Monocytes %                      9.4


Eosinophils %                    3.1


Basophils %                      0.4


Nucleated Red Blood              0.0


Cells %


Immature Granulocytes #        0.020


Neutrophils #                    2.4


Lymphocytes #                    2.1


Monocytes #                      0.5


Eosinophils #                    0.2


Basophils #                      0.0


Nucleated Red Blood              0.0


Cells #


Sodium Level                     141


Potassium Level                  3.5


Chloride Level                   107


Carbon Dioxide Level              26


Anion Gap                          8


Blood Urea Nitrogen                7


Creatinine                      0.45


Est Glomerular Filtrat    > 60  
       
             
             



Rate
mL/min


Glucose Level                    148


Calcium Level                    9.3


Total Bilirubin                  0.8


Direct Bilirubin                0.00


Indirect Bilirubin               0.8


Aspartate Amino                  42  
  
             
             



Transf
(AST/SGOT)


Alanine                         91  H
  
             
             



Aminotransferase
(ALT/SG


PT)


Alkaline Phosphatase            236  H


Total Protein                    6.6


Albumin                          3.4


Globulin                        3.20


Albumin/Globulin Ratio          1.06


Amylase Level                     63


Lipase                          308  H


Bedside Glucose                                147           171








Medications


Medication





Current Medications


IV Flush (NS 3 ml) 3 ml PER PROTOCOL IV ;  Start 4/3/19 at 03:00


Ondansetron HCl (Zofran Inj) 4 mg Q6H  PRN IV NAUSEA/VOMITING;  Start 4/3/19 at 


03:00


Acetaminophen (Tylenol Tab) 650 mg Q6H  PRN PO .PAIN 1-3 OR TEMP;  Start 4/3/19 


at 03:00


Docusate Sodium (Colace) 100 mg Q12H  PRN PO .CONSTIPATION;  Start 4/3/19 at 


03:00


Bisacodyl (Dulcolax) 5 mg DAILY  PRN PO .CONSTIPATION;  Start 4/3/19 at 03:00


Diagnostic Test (Pha) (Accu-Chek) 1 ea 02 XX ;  Start 4/4/19 at 02:00


Insulin Aspart (Novolog Insulin Pen) NOVOLOG *MILD* ALGORI... Q4 SC  Last 


administered on 4/5/19at 09:04; Admin Dose 1 UNIT;  Start 4/3/19 at 09:00


Miscellaneous Information 1 ea NOTE XX ;  Start 4/3/19 at 09:00


Glucose (Glutose) 15 gm Q15M  PRN PO DECREASED GLUCOSE;  Start 4/3/19 at 09:00


Glucose (Glutose) 22.5 gm Q15M  PRN PO DECREASED GLUCOSE;  Start 4/3/19 at 09:00


Dextrose (D50w Syringe) 25 ml Q15M  PRN IV DECREASED GLUCOSE;  Start 4/3/19 at 


09:00


Dextrose (D50w Syringe) 50 ml Q15M  PRN IV DECREASED GLUCOSE;  Start 4/3/19 at 


09:00


Glucagon (Glucagen) 1 mg Q15M  PRN IM DECREASED GLUCOSE;  Start 4/3/19 at 09:00


Glucose (Glutose) 15 gm Q15M  PRN BUCCAL DECREASED GLUCOSE;  Start 4/3/19 at 


09:00


Hydromorphone HCl (Dilaudid) 1 mg Q4H  PRN IV .SEVERE PAIN 7-10 Last 


administered on 4/4/19at 17:40; Admin Dose 1 MG;  Start 4/3/19 at 11:00


Dextrose/Sodium Chloride 1,000 ml @  80 mls/hr [id number] IV  Last administered on 


4/5/19at 01:24; Admin Dose 80 MLS/HR;  Start 4/3/19 at 10:30


Insulin Glargine (Lantus) 9 units DAILY@0800 SC  Last administered on 4/5/19at 


09:04; Admin Dose 9 UNITS;  Start 4/3/19 at 10:30


Pantoprazole (Protonix Iv) 40 mg DAILY@06 IV  Last administered on 4/5/19at 


05:19; Admin Dose 40 MG;  Start 4/3/19 at 10:30


Piperacillin Sod/ Tazobactam Sod 100 ml @  200 mls/hr Q6 IVPB  Last administered


on 4/5/19at 05:19; Admin Dose 200 MLS/HR;  Start 4/4/19 at 06:00











JOSÉ MANUEL HULL NP                Apr 5, 2019 10:02

## 2019-04-05 NOTE — PN
Date/Time of Note


Date/Time of Note


DATE: 4/5/19 


TIME: 13:47





Assessment/Plan


VTE Prophylaxis


Risk score (from Ns)>0 risk:  1


SCD applied (from Ns):  Yes


Pharmacological prophylaxis:  heparin





Lines/Catheters


IV Catheter Type (from [facility]):  Peripheral IV


Urinary Cath still in place:  No





Assessment/Plan


Assessment/Plan


Assessment:


Acute pancreatitis likely secondary to cholelithiasis


   -MRCP- Mild common bile duct dilatation up to 10 mm. No choledocholithiasis 


is seen.


Cholelithiasis


Elevated LFTs-trending down


   -Hepatitis serology pending


Indirect hyperbilirubinemia-resolved





Plan:


Advance diet to soft 


Reviewed results with Dr. Cheng- likely stone passage no plan for ERCP at this 


time- as patient is improving and no obstruction noted


Pt declines cholecystectomy, would prefer to go home and have surgery completed 


there- per surgery 


Supportive care 


Patient seen in collaboration with Dr. Cheng/Ophelia








Subjective:


Course reviewed with nursing staff


Patient interviewed and examined


All labs, imaging and other results reviewed





Patient is doing well.  Denies abdominal pain, nausea or vomiting.  Tolerating 


clear liquid diet well.  Upper abdominal pain elicited only on palpation and it 


is mild.  Lipase is down to 302.  Liver function test is trending down.  Plan is


to advance the diet to soft.  Continue observation.








PHYSICAL EXAMINATION:


GENERAL: Well developed, well nourished, alert & oriented x 3


SKIN: No lesions


EYES: Pupils equal reactive to light,  no discharge.


EARS/NOSE AND THROAT: Ears normal, nose normal


NECK: Supple, no masses


CHEST: Inspection within normal limits.


CARDIOVASCULAR: Heart: Regular rate and rhythm


RESPIRATORY: Lungs clear to auscultation 


GASTROINTESTINAL AND LIVER: Abdomen: Soft, mild epigastric tenderness on 


palpation, non-distended, no hernias, no masses, no organomegaly, no ascites, no


guarding, no rebound tenderness, normoactive bowel sounds. Rectal: Deferred. 


EXTREMITIES: No cyanosis, clubbing or edema


Result Diagram:  


4/5/19 0415                                                                     


          4/5/19 0415





Results 24hrs





Laboratory Tests


Test
                     4/4/19
17:31  4/4/19
20:28  4/5/19
01:24  4/5/19
04:15


Bedside Glucose                  143           128           125


White Blood Count                                                          5.2


Red Blood Count                                                           4.41


Hemoglobin                                                                12.1


Hematocrit                                                                37.6


Mean Corpuscular Volume                                                   85.3


Mean Corpuscular                                                         27.4  L


Hemoglobin


Mean Corpuscular          
             
             
                  32.2  



Hemoglobin
Concent


Red Cell Distribution                                                     12.9


Width


Platelet Count                                                             159


Mean Platelet Volume                                                      10.2


Immature Granulocytes %                                                  0.400


Neutrophils %                                                             46.2


Lymphocytes %                                                             40.5


Monocytes %                                                                9.4


Eosinophils %                                                              3.1


Basophils %                                                                0.4


Nucleated Red Blood                                                        0.0


Cells %


Immature Granulocytes #                                                  0.020


Neutrophils #                                                              2.4


Lymphocytes #                                                              2.1


Monocytes #                                                                0.5


Eosinophils #                                                              0.2


Basophils #                                                                0.0


Nucleated Red Blood                                                        0.0


Cells #


Sodium Level                                                               141


Potassium Level                                                            3.5


Chloride Level                                                             107


Carbon Dioxide Level                                                        26


Anion Gap                                                                    8


Blood Urea Nitrogen                                                          7


Creatinine                                                                0.45


Est Glomerular Filtrat    
             
             
             > 60  



Rate
mL/min


Glucose Level                                                              148


Calcium Level                                                              9.3


Total Bilirubin                                                            0.8


Direct Bilirubin                                                          0.00


Indirect Bilirubin                                                         0.8


Aspartate Amino           
             
             
                    42  



Transf
(AST/SGOT)


Alanine                   
             
             
                   91  H



Aminotransferase
(ALT/SG


PT)


Alkaline Phosphatase                                                      236  H


Total Protein                                                              6.6


Albumin                                                                    3.4


Globulin                                                                  3.20


Albumin/Globulin Ratio                                                    1.06


Amylase Level                                                               63


Lipase                                                                    308  H


Test
                     4/5/19
05:24  4/5/19
08:13  4/5/19
12:03  



Bedside Glucose                  147           171           162





CC:   KEATON HUMPHREY L ;





Exam/Review of Systems


Exam


Vitals





Vital Signs


  Date      Temp  Pulse  Resp  B/P (MAP)   Pulse Ox  O2          O2 Flow    FiO2


Time                                                 Delivery    Rate


    4/5/19  98.0     67    18      116/54        99  Room Air


     08:25                           (74)








Intake and Output





4/4/19 4/4/19 4/5/19





1515:00


23:00


07:00





IntakeIntake Total


580 ml


420 ml


1080 ml





BalanceBalance


580 ml


420 ml


1080 ml














Results


Results 24hrs





Laboratory Tests


Test
                     4/4/19
17:31  4/4/19
20:28  4/5/19
01:24  4/5/19
04:15


Bedside Glucose                  143           128           125


White Blood Count                                                          5.2


Red Blood Count                                                           4.41


Hemoglobin                                                                12.1


Hematocrit                                                                37.6


Mean Corpuscular Volume                                                   85.3


Mean Corpuscular                                                         27.4  L


Hemoglobin


Mean Corpuscular          
             
             
                  32.2  



Hemoglobin
Concent


Red Cell Distribution                                                     12.9


Width


Platelet Count                                                             159


Mean Platelet Volume                                                      10.2


Immature Granulocytes %                                                  0.400


Neutrophils %                                                             46.2


Lymphocytes %                                                             40.5


Monocytes %                                                                9.4


Eosinophils %                                                              3.1


Basophils %                                                                0.4


Nucleated Red Blood                                                        0.0


Cells %


Immature Granulocytes #                                                  0.020


Neutrophils #                                                              2.4


Lymphocytes #                                                              2.1


Monocytes #                                                                0.5


Eosinophils #                                                              0.2


Basophils #                                                                0.0


Nucleated Red Blood                                                        0.0


Cells #


Sodium Level                                                               141


Potassium Level                                                            3.5


Chloride Level                                                             107


Carbon Dioxide Level                                                        26


Anion Gap                                                                    8


Blood Urea Nitrogen                                                          7


Creatinine                                                                0.45


Est Glomerular Filtrat    
             
             
             > 60  



Rate
mL/min


Glucose Level                                                              148


Calcium Level                                                              9.3


Total Bilirubin                                                            0.8


Direct Bilirubin                                                          0.00


Indirect Bilirubin                                                         0.8


Aspartate Amino           
             
             
                    42  



Transf
(AST/SGOT)


Alanine                   
             
             
                   91  H



Aminotransferase
(ALT/SG


PT)


Alkaline Phosphatase                                                      236  H


Total Protein                                                              6.6


Albumin                                                                    3.4


Globulin                                                                  3.20


Albumin/Globulin Ratio                                                    1.06


Amylase Level                                                               63


Lipase                                                                    308  H


Test
                     4/5/19
05:24  4/5/19
08:13  4/5/19
12:03  



Bedside Glucose                  147           171           162








Medications


Medication





Current Medications


IV Flush (NS 3 ml) 3 ml PER PROTOCOL IV ;  Start 4/3/19 at 03:00


Ondansetron HCl (Zofran Inj) 4 mg Q6H  PRN IV NAUSEA/VOMITING;  Start 4/3/19 at 


03:00


Acetaminophen (Tylenol Tab) 650 mg Q6H  PRN PO .PAIN 1-3 OR TEMP;  Start 4/3/19 


at 03:00


Docusate Sodium (Colace) 100 mg Q12H  PRN PO .CONSTIPATION;  Start 4/3/19 at 


03:00


Bisacodyl (Dulcolax) 5 mg DAILY  PRN PO .CONSTIPATION;  Start 4/3/19 at 03:00


Diagnostic Test (Pha) (Accu-Chek) 1 ea 02 XX ;  Start 4/4/19 at 02:00


Insulin Aspart (Novolog Insulin Pen) NOVOLOG *MILD* ALGORI... Q4 SC  Last 


administered on 4/5/19at 12:41; Admin Dose 1 UNIT;  Start 4/3/19 at 09:00


Miscellaneous Information 1 ea NOTE XX ;  Start 4/3/19 at 09:00


Glucose (Glutose) 15 gm Q15M  PRN PO DECREASED GLUCOSE;  Start 4/3/19 at 09:00


Glucose (Glutose) 22.5 gm Q15M  PRN PO DECREASED GLUCOSE;  Start 4/3/19 at 09:00


Dextrose (D50w Syringe) 25 ml Q15M  PRN IV DECREASED GLUCOSE;  Start 4/3/19 at 


09:00


Dextrose (D50w Syringe) 50 ml Q15M  PRN IV DECREASED GLUCOSE;  Start 4/3/19 at 


09:00


Glucagon (Glucagen) 1 mg Q15M  PRN IM DECREASED GLUCOSE;  Start 4/3/19 at 09:00


Glucose (Glutose) 15 gm Q15M  PRN BUCCAL DECREASED GLUCOSE;  Start 4/3/19 at 


09:00


Hydromorphone HCl (Dilaudid) 1 mg Q4H  PRN IV .SEVERE PAIN 7-10 Last 


administered on 4/4/19at 17:40; Admin Dose 1 MG;  Start 4/3/19 at 11:00


Dextrose/Sodium Chloride 1,000 ml @  80 mls/hr [id number] IV  Last administered on 


4/5/19at 01:24; Admin Dose 80 MLS/HR;  Start 4/3/19 at 10:30


Insulin Glargine (Lantus) 9 units DAILY@0800 SC  Last administered on 4/5/19at 


09:04; Admin Dose 9 UNITS;  Start 4/3/19 at 10:30


Pantoprazole (Protonix Iv) 40 mg DAILY@06 IV  Last administered on 4/5/19at 


05:19; Admin Dose 40 MG;  Start 4/3/19 at 10:30


Piperacillin Sod/ Tazobactam Sod 100 ml @  200 mls/hr Q6 IVPB  Last administered


on 4/5/19at 11:45; Admin Dose 200 MLS/HR;  Start 4/4/19 at 06:00











RACHEL NAGY NP             Apr 5, 2019 13:53

## 2019-04-05 NOTE — DS
Date/Time of Note


Date/Time of Note


DATE: 4/5/19 


TIME: 14:32





Discharge Summary


Admission/Discharge Info


Admit Date/Time


Apr 3, 2019 at 02:30


Discharge Date/Time





Discharge Diagnosis





1.  Gallstone pancreatitis.RESOLVED


2.  Cholelithiasis with probable cholecystitis.-Outpatient cholecystectomy 


recommended 


3. CBD dilatation 10 mm in size, no choledocholithiasis identified an MRI, 


likely passing stone.


4.DMII


5.  Fatty liver.


Patient Condition:  Stable


Consults


Dr. Brizuela, surgery


Dr. Penaloza, gastroenterology


Procedures


4/4/2019.  MRCP.


IMPRESSION:


 


Cholelithiasis. Gallbladder wall thickening and edema suggestive of acute 


cholecystitis in the correct clinical setting. 


 


Mild common bile duct dilatation up to 10 mm. No choledocholithiasis is seen.


 


 


RPTAT: UU


_____________________________________________ 


Bryce Stahl, Physician           Date    Time 


Electronically viewed and signed by Bryce Stahl, Physician on 04/04/2019 10:0





4/2/2019.  CT abdomen and pelvis.


IMPRESSION:


 


1.  Distended lateral gallbladder with cholelithiasis and mildly dilated common 


bile duct.


2.  No CT evidence for appendicitis.


3.  Mild vascular calcifications.


_____________________________________________ 


.Igor Jenkins MD, MD           Date    Time 


Electronically viewed and signed by .Igor Jenkins MD, MD on 04/03/2019 01:27


Hospital Course


58-year-old female with diabetes, admitted with RLQ abdominal pain associated 


with nonbilious/nonbloody vomiting times 4-day duration found to have gallstone 


pancreatitis.





She was admitted, comorbidities were managed per medical records.  Patient had 


GI and surgery evaluation.  She was placed on bowel rest with IV fluids, pain 


control.  Patient symptoms improved.  Lipase started to trend down.  Patient was


noted with CBD dilatation 10 mm in size and an MRCP did not detect choledocholit


hiasis.  This most likely representing passing of a biliary stone.  There was no


immediate need for an ERCP at this time.  Patient symptoms have completely 


resolved.  At this point, recommendation is elective outpatient cholecystectomy 


for which patient opted to do it in [facility].  Patient with clinical and 


laboratory findings of resolution of pancreatitis.  She was advanced on a diet 


which she was able to tolerate.  At this time, patient is having regular bowel 


movements, benign abdominal exam and stable for outpatient follow-up.





Approximately 60 minutes was spent in coordinating the discharge of this 


patient.





Patient was seen in collaboration with Dr. Elizondo.


Home Meds


Active Scripts


Metformin* (Glucophage*) 1,000 Mg Tablet, 1000 MG PO WITH BREAKFAST DINNE, #60 


TAB


   Prov:JOSÉ MANUEL HULL. NP         4/5/19


Reported Medications


Acetaminophen* (Acetaminophen*) 500 MG Extra Strength Tablet, 500 MG PO Q4H PRN 


for PAIN AND OR ELEVATED TEMP, TAB


   4/3/19


Omeprazole* (Omeprazole*) Unknown Strength Capsule., PO BID, #60 CAP


   4/3/19


Discontinued Reported Medications


Metformin* (Glucophage*) Unknown Strength Tab, PO WITH BREAKFAST DINNE, #30 TAB


   4/3/19


Follow-up Plan


Follow-up with primary care physician in 1 week.  You need your gallbladder 


removal surgery as outpatient.


Primary Care Provider


Care Physician No Primary


Pending Labs





Laboratory Tests


Test
               4/4/19
17:31    4/4/19
20:28    4/5/19
01:24    4/5/19
04:15


Bedside                      143             128             125  



Glucose
          mg/dL
()  mg/dL
()  mg/dL
()


White Blood      
                
               
                          5.2


Count
                                                            10^3/ul
(4.8-1


                                                                            0.8)


Red Blood        
                
               
                         4.41


Count
                                                            10^6/ul
(4.20-


                                                                           5.40)


Hemoglobin
      
                
               
                         12.1


                                                                  g/dl
(12.0-16.


                                                                              0)


Hematocrit
      
                
               
                         37.6


                                                                   %
(37.0-47.0)


Mean             
                
               
                         85.3


Corpuscular                                                       fl
(82.0-101.0


Volume
                                                                        )


Mean             
                
               
                         27.4


Corpuscular                                                       pg
(29.0-33.0)


Hemoglobin



Mean             
                
               
                         32.2


Corpuscular                                                       g/dl
(32.0-37.


Hemoglobin
Conc                                                               0)


ent


Red Cell         
                
               
                         12.9


Distribution                                                       %
(11.5-14.5)


Width



Platelet Count
  
                
               
                          159


                                                                  10^3/UL
(140-4


                                                                             15)


Mean Platelet    
                
               
                         10.2


Volume
                                                            fl
(7.4-10.4)


Immature         
                
               
                        0.400


Granulocytes %
                                                   %
(0.001-0.429


                                                                               )


Neutrophils %
   
                
               
                         46.2


                                                                   %
(39.0-77.0)


Lymphocytes %
   
                
               
                         40.5


                                                                   %
(15.0-51.0)


Monocytes %
     
                
               
                          9.4


                                                                    %
(0.0-11.0)


Eosinophils %
   
                
               
                          3.1


                                                                     %
(0.0-7.0)


Basophils %
     
                
               
                          0.4


                                                                     %
(0.0-2.0)


Nucleated Red    
                
               
                          0.0


Blood Cells %
                                                    /100WBC
(0.0-0


                                                                             .0)


Immature         
                
               
                        0.020


Granulocytes #
                                                   10^3/ul
(0.0-0


                                                                           .031)


Neutrophils #
   
                
               
                          2.4


                                                                  10^3/ul
(1.6-7


                                                                             .5)


Lymphocytes #
   
                
               
                          2.1


                                                                  10^3/ul
(0.8-2


                                                                             .9)


Monocytes #
     
                
               
                          0.5


                                                                  10^3/ul
(0.3-0


                                                                             .9)


Eosinophils #
   
                
               
                          0.2


                                                                  10^3/ul
(0.0-0


                                                                             .5)


Basophils #
     
                
               
                          0.0


                                                                  10^3/ul
(0.0-0


                                                                             .1)


Nucleated Red    
                
               
                          0.0


Blood Cells #
                                                    10^3/ul
(0.0-0


                                                                             .0)


Sodium Level
    
                
               
                          141


                                                                  mmol/L
(135-14


                                                                              4)


Potassium        
                
               
                          3.5


Level
                                                            mmol/L
(3.5-5.


                                                                              1)


Chloride Level
  
                
               
                          107


                                                                  mmol/L
(


                                                                               )


Carbon Dioxide   
                
               
                           26


Level
                                                            mmol/L
(21-31)


Anion Gap                                                              8 (5-13)


Blood Urea                                                        7 mg/dl (7-20)


Nitrogen


Creatinine
      
                
               
                         0.45


                                                                  mg/dl
(0.44-1.


                                                                             00)


Est Glomerular   
                
               
               > 60


Filtrat                                                           mL/min
(>60)


Rate
mL/min


Glucose Level
   
                
               
                          148


                                                                  mg/dl
()


Calcium Level
   
                
               
                          9.3


                                                                  mg/dl
(8.4-10.


                                                                              2)


Total            
                
               
                          0.8


Bilirubin
                                                        mg/dl
(0.2-1.3


                                                                               )


Direct           
                
               
                         0.00


Bilirubin
                                                        mg/dl
(0.00-0.


                                                                             20)


Indirect         
                
               
                          0.8


Bilirubin
                                                         mg/dl
(0-1.1)


Aspartate Amino  
                
               
                           42


Transf
(AST/SGO                                                     IU/L
(15-46)


T)


Alanine          
                
               
                           91


Aminotransferas                                                     IU/L
(13-69)


e
(ALT/SGPT)


Alkaline         
                
               
                          236


Phosphatase
                                                       IU/L
()


Total Protein
   
                
               
                          6.6


                                                                  g/dl
(6.1-8.1)


Albumin
         
                
               
                          3.4


                                                                  g/dl
(3.3-4.9)


Globulin
        
                
               
                         3.20


                                                                  g/dl
(1.3-3.2)


Albumin/Globuli                                                            1.06


n Ratio


Amylase Level
   
                
               
                           63


                                                                    U/L
()


Lipase
          
                
               
                          308


                                                                    U/L
()


Test
               4/5/19
05:24    4/5/19
08:13    4/5/19
12:03  



Bedside                      147             171             162  



Glucose
          mg/dL
()  mg/dL
()  mg/dL
()














JOSÉ MANUEL HULL NP                Apr 5, 2019 14:43

## 2019-04-05 NOTE — PDOCDIS
Discharge Instructions


CONDITION


                [id number]
Patient Condition:  [id number]
Stable








HOME CARE INSTRUCTIONS:


[id number]
Your diet recommendation is:  [id number]
Carbohydrate controlled/low


                                         calorie diet.








FOLLOW UP/APPOINTMENTS


Follow-up Plan


Follow-up with primary care physician in 1 week.  You need your gallbladder 


removal surgery as outpatient.











JOSÉ MANUEL HULL NP                Apr 5, 2019 14:24

## 2020-07-17 NOTE — PN
Date/Time of Note


Date/Time of Note


DATE: 4/5/19 


TIME: 13:27





Assessment/Plan


Lines/Catheters


IV Catheter Type (from [facility]):  Peripheral IV


[place] in Place (from [facility]):  No





Assessment/Plan


Chief Complaint/Hosp Course


1.  Abdominal pain: Resolved


2.  Nausea vomiting: Resolved


3.  Gallstones


4.  Pancreatitis: Likely secondary to #3: Continuing to improve


5.  Transaminitis: Improved


6.  Hyperbilirubinemia: Improved; possible cholecystitis


-Judicious IV fluid


-Trend labs


-GI consult noted> possible ERCP


-MRCP noted with dilated CBD


-low-fat low-cholesterol diet


-Okay for discharge from surgical standpoint.> Outpatient follow-up for 


cholecystectomy, had long discussion with family. family and patient would 


prefer to have outpatient surgery in [facility] where the patient resides.  The


patient is planning to fly back on the 17th of this month.  She was instructed 


to follow-up with primary care and surgeon when she returns ASAP.


7.  Gastritis


-Diet and lifestyle optimization


-Antacids





Thank you.  Patient seen and examined in collaboration with Dr. Paramjit Brizuela.





Subjective


24 Hr Interval Summary


Feels well.  No acute abdominal pain.  Tolerating liquid diet.  No fevers, 


chills, sob, congested cough, cp, palpitations, ha, dizziness, nausea, vomiting,


diarrhea, dysuria.





Exam/Review of Systems


Vital Signs


Vitals





Vital Signs


  Date      Temp  Pulse  Resp  B/P (MAP)   Pulse Ox  O2          O2 Flow    FiO2


Time                                                 Delivery    Rate


    4/5/19  98.0     67    18      116/54        99  Room Air


     08:25                           (74)








Intake and Output





4/4/19 4/4/19 4/5/19





1515:00


23:00


07:00





IntakeIntake Total


580 ml


420 ml


1080 ml





BalanceBalance


580 ml


420 ml


1080 ml














Exam


Free Text/Dictation


Constitutional:  alert, oriented; 


   No distress


Psych:  nl mood/affect; 


   No anxiety


Head:  normocephalic, atraumatic


Eyes:  nl conjunctiva, EOMI, PERRL


ENMT:  nl external ears & nose, mucosa pink and moist


Neck:  supple, non-tender; 


   No jvd


Respiratory:  normal air movement; 


   No congested cough, No labored breathing


Cardiovascular:  regular rate and rhythm; 


   No edema


Gastrointestinal:  soft, nontender; 


   No distended, No rebound or guarding


Musculoskeletal:  nl extremities to inspection; 


   No joint tenderness


Extremities:  normal pulses; 


   No calf tenderness, No cyanosis


Neurological:  nl mental status, nl speech, nl strength


Skin:  nl turgor; 


   No rash or lesions, No diaphoresis


Lymph:  nl lymph nodes





Results


Result Diagram:  


4/5/19 0415                                                                     


          4/5/19 0415














LONA RAMSEY NP        Apr 5, 2019 13:31 Dilution (U/ 0.1cc): 12